# Patient Record
Sex: FEMALE | Race: WHITE | NOT HISPANIC OR LATINO | Employment: FULL TIME | URBAN - METROPOLITAN AREA
[De-identification: names, ages, dates, MRNs, and addresses within clinical notes are randomized per-mention and may not be internally consistent; named-entity substitution may affect disease eponyms.]

---

## 2017-05-08 RX ORDER — METHIMAZOLE 5 MG/1
5 TABLET ORAL DAILY
Status: ON HOLD | COMMUNITY
End: 2017-05-17

## 2017-05-08 RX ORDER — ACETAMINOPHEN 325 MG/1
650 TABLET ORAL EVERY 4 HOURS PRN
Status: ON HOLD | COMMUNITY
End: 2017-05-17

## 2017-05-08 RX ORDER — ESCITALOPRAM OXALATE 10 MG/1
10 TABLET ORAL DAILY
COMMUNITY
End: 2017-08-25

## 2017-05-17 ENCOUNTER — HOSPITAL ENCOUNTER (OUTPATIENT)
Facility: MEDICAL CENTER | Age: 32
End: 2017-05-18
Attending: SURGERY | Admitting: SURGERY
Payer: COMMERCIAL

## 2017-05-17 PROBLEM — E05.20 GOITER, TOXIC, MULTINODULAR: Status: ACTIVE | Noted: 2017-05-17

## 2017-05-17 LAB
ALBUMIN SERPL BCP-MCNC: 4.2 G/DL (ref 3.2–4.9)
B-HCG FREE SERPL-ACNC: 5.7 G/DL
CALCIUM SERPL-MCNC: 8.9 MG/DL (ref 8.5–10.5)
HCG SERPL QL: NEGATIVE
IHCGL IHCGL: NORMAL G/DL

## 2017-05-17 PROCEDURE — 502594 HCHG SCISSOR HANDLE, HARMONIC ACE: Performed by: SURGERY

## 2017-05-17 PROCEDURE — 502590 HCHG PROBE, PRASS STANDARD: Performed by: SURGERY

## 2017-05-17 PROCEDURE — 160042 HCHG SURGERY MINUTES - EA ADDL 1 MIN LEVEL 5: Performed by: SURGERY

## 2017-05-17 PROCEDURE — 700101 HCHG RX REV CODE 250

## 2017-05-17 PROCEDURE — 502240 HCHG MISC OR SUPPLY RC 0272: Performed by: SURGERY

## 2017-05-17 PROCEDURE — 36415 COLL VENOUS BLD VENIPUNCTURE: CPT

## 2017-05-17 PROCEDURE — 700101 HCHG RX REV CODE 250: Performed by: SURGERY

## 2017-05-17 PROCEDURE — A9270 NON-COVERED ITEM OR SERVICE: HCPCS

## 2017-05-17 PROCEDURE — 160036 HCHG PACU - EA ADDL 30 MINS PHASE I: Performed by: SURGERY

## 2017-05-17 PROCEDURE — 160035 HCHG PACU - 1ST 60 MINS PHASE I: Performed by: SURGERY

## 2017-05-17 PROCEDURE — 700111 HCHG RX REV CODE 636 W/ 250 OVERRIDE (IP)

## 2017-05-17 PROCEDURE — 96374 THER/PROPH/DIAG INJ IV PUSH: CPT

## 2017-05-17 PROCEDURE — 700102 HCHG RX REV CODE 250 W/ 637 OVERRIDE(OP)

## 2017-05-17 PROCEDURE — A9270 NON-COVERED ITEM OR SERVICE: HCPCS | Performed by: SURGERY

## 2017-05-17 PROCEDURE — 501838 HCHG SUTURE GENERAL: Performed by: SURGERY

## 2017-05-17 PROCEDURE — G0378 HOSPITAL OBSERVATION PER HR: HCPCS

## 2017-05-17 PROCEDURE — 500126 HCHG BOVIE, NEEDLE TIP: Performed by: SURGERY

## 2017-05-17 PROCEDURE — 160002 HCHG RECOVERY MINUTES (STAT): Performed by: SURGERY

## 2017-05-17 PROCEDURE — 96375 TX/PRO/DX INJ NEW DRUG ADDON: CPT

## 2017-05-17 PROCEDURE — A6402 STERILE GAUZE <= 16 SQ IN: HCPCS | Performed by: SURGERY

## 2017-05-17 PROCEDURE — 84702 CHORIONIC GONADOTROPIN TEST: CPT

## 2017-05-17 PROCEDURE — 700102 HCHG RX REV CODE 250 W/ 637 OVERRIDE(OP): Performed by: SURGERY

## 2017-05-17 PROCEDURE — 700111 HCHG RX REV CODE 636 W/ 250 OVERRIDE (IP): Performed by: SURGERY

## 2017-05-17 PROCEDURE — 502627 HCHG HEMOSTAT, SURGICEL 4X4: Performed by: SURGERY

## 2017-05-17 PROCEDURE — 160031 HCHG SURGERY MINUTES - 1ST 30 MINS LEVEL 5: Performed by: SURGERY

## 2017-05-17 PROCEDURE — 88307 TISSUE EXAM BY PATHOLOGIST: CPT | Mod: 59

## 2017-05-17 PROCEDURE — 84703 CHORIONIC GONADOTROPIN ASSAY: CPT

## 2017-05-17 PROCEDURE — 82040 ASSAY OF SERUM ALBUMIN: CPT

## 2017-05-17 PROCEDURE — 160048 HCHG OR STATISTICAL LEVEL 1-5: Performed by: SURGERY

## 2017-05-17 PROCEDURE — 160009 HCHG ANES TIME/MIN: Performed by: SURGERY

## 2017-05-17 PROCEDURE — 82310 ASSAY OF CALCIUM: CPT

## 2017-05-17 RX ORDER — GABAPENTIN 300 MG/1
CAPSULE ORAL
Status: COMPLETED
Start: 2017-05-17 | End: 2017-05-17

## 2017-05-17 RX ORDER — DIPHENHYDRAMINE HYDROCHLORIDE 50 MG/ML
25 INJECTION INTRAMUSCULAR; INTRAVENOUS EVERY 6 HOURS PRN
Status: DISCONTINUED | OUTPATIENT
Start: 2017-05-17 | End: 2017-05-18 | Stop reason: HOSPADM

## 2017-05-17 RX ORDER — HALOPERIDOL 5 MG/ML
1 INJECTION INTRAMUSCULAR EVERY 6 HOURS PRN
Status: DISCONTINUED | OUTPATIENT
Start: 2017-05-17 | End: 2017-05-18 | Stop reason: HOSPADM

## 2017-05-17 RX ORDER — DEXTROSE MONOHYDRATE, SODIUM CHLORIDE, AND POTASSIUM CHLORIDE 50; 1.49; 4.5 G/1000ML; G/1000ML; G/1000ML
INJECTION, SOLUTION INTRAVENOUS CONTINUOUS
Status: DISCONTINUED | OUTPATIENT
Start: 2017-05-17 | End: 2017-05-18 | Stop reason: HOSPADM

## 2017-05-17 RX ORDER — CELECOXIB 200 MG/1
200 CAPSULE ORAL 2 TIMES DAILY WITH MEALS
Status: DISCONTINUED | OUTPATIENT
Start: 2017-05-17 | End: 2017-05-18 | Stop reason: HOSPADM

## 2017-05-17 RX ORDER — DEXTROSE MONOHYDRATE, SODIUM CHLORIDE, AND POTASSIUM CHLORIDE 50; 1.49; 4.5 G/1000ML; G/1000ML; G/1000ML
INJECTION, SOLUTION INTRAVENOUS
Status: COMPLETED | OUTPATIENT
Start: 2017-05-17 | End: 2017-05-17

## 2017-05-17 RX ORDER — ONDANSETRON 2 MG/ML
4 INJECTION INTRAMUSCULAR; INTRAVENOUS EVERY 4 HOURS PRN
Status: DISCONTINUED | OUTPATIENT
Start: 2017-05-17 | End: 2017-05-18 | Stop reason: HOSPADM

## 2017-05-17 RX ORDER — DEXAMETHASONE SODIUM PHOSPHATE 4 MG/ML
4 INJECTION, SOLUTION INTRA-ARTICULAR; INTRALESIONAL; INTRAMUSCULAR; INTRAVENOUS; SOFT TISSUE
Status: DISCONTINUED | OUTPATIENT
Start: 2017-05-17 | End: 2017-05-18 | Stop reason: HOSPADM

## 2017-05-17 RX ORDER — OXYCODONE HCL 5 MG/5 ML
SOLUTION, ORAL ORAL
Status: COMPLETED
Start: 2017-05-17 | End: 2017-05-17

## 2017-05-17 RX ORDER — ACETAMINOPHEN 500 MG
1000 TABLET ORAL EVERY 6 HOURS
Status: DISCONTINUED | OUTPATIENT
Start: 2017-05-17 | End: 2017-05-18 | Stop reason: HOSPADM

## 2017-05-17 RX ORDER — OXYCODONE HYDROCHLORIDE 5 MG/1
5 TABLET ORAL
Status: DISCONTINUED | OUTPATIENT
Start: 2017-05-17 | End: 2017-05-18 | Stop reason: HOSPADM

## 2017-05-17 RX ORDER — ACETAMINOPHEN 500 MG
TABLET ORAL
Status: COMPLETED
Start: 2017-05-17 | End: 2017-05-17

## 2017-05-17 RX ORDER — LEVOTHYROXINE SODIUM 0.12 MG/1
125 TABLET ORAL
Status: DISCONTINUED | OUTPATIENT
Start: 2017-05-18 | End: 2017-05-18 | Stop reason: HOSPADM

## 2017-05-17 RX ORDER — OXYCODONE HYDROCHLORIDE 10 MG/1
10 TABLET ORAL
Status: DISCONTINUED | OUTPATIENT
Start: 2017-05-17 | End: 2017-05-18 | Stop reason: HOSPADM

## 2017-05-17 RX ORDER — CELECOXIB 200 MG/1
CAPSULE ORAL
Status: COMPLETED
Start: 2017-05-17 | End: 2017-05-17

## 2017-05-17 RX ORDER — OMEPRAZOLE 20 MG/1
20 CAPSULE, DELAYED RELEASE ORAL DAILY
Status: DISCONTINUED | OUTPATIENT
Start: 2017-05-18 | End: 2017-05-18 | Stop reason: HOSPADM

## 2017-05-17 RX ORDER — ESCITALOPRAM OXALATE 10 MG/1
10 TABLET ORAL DAILY
Status: DISCONTINUED | OUTPATIENT
Start: 2017-05-18 | End: 2017-05-18 | Stop reason: HOSPADM

## 2017-05-17 RX ADMIN — HYDROMORPHONE HYDROCHLORIDE 0.2 MG: 1 INJECTION, SOLUTION INTRAMUSCULAR; INTRAVENOUS; SUBCUTANEOUS at 12:24

## 2017-05-17 RX ADMIN — GABAPENTIN 300 MG: 300 CAPSULE ORAL at 06:40

## 2017-05-17 RX ADMIN — DEXTROSE MONOHYDRATE, SODIUM CHLORIDE, AND POTASSIUM CHLORIDE: 50; 1.49; 4.5 INJECTION, SOLUTION INTRAVENOUS at 14:00

## 2017-05-17 RX ADMIN — ACETAMINOPHEN 1000 MG: 500 TABLET, FILM COATED ORAL at 06:40

## 2017-05-17 RX ADMIN — POTASSIUM CHLORIDE, DEXTROSE MONOHYDRATE AND SODIUM CHLORIDE: 150; 5; 450 INJECTION, SOLUTION INTRAVENOUS at 20:11

## 2017-05-17 RX ADMIN — OXYCODONE HYDROCHLORIDE 5 MG: 5 TABLET ORAL at 20:02

## 2017-05-17 RX ADMIN — CELECOXIB 400 MG: 200 CAPSULE ORAL at 06:40

## 2017-05-17 RX ADMIN — HYDROMORPHONE HYDROCHLORIDE 0.2 MG: 1 INJECTION, SOLUTION INTRAMUSCULAR; INTRAVENOUS; SUBCUTANEOUS at 12:17

## 2017-05-17 RX ADMIN — HYDROMORPHONE HYDROCHLORIDE 0.2 MG: 1 INJECTION, SOLUTION INTRAMUSCULAR; INTRAVENOUS; SUBCUTANEOUS at 13:00

## 2017-05-17 RX ADMIN — OXYCODONE HYDROCHLORIDE 5 MG: 5 TABLET ORAL at 16:38

## 2017-05-17 RX ADMIN — OXYCODONE HYDROCHLORIDE 10 MG: 5 SOLUTION ORAL at 11:00

## 2017-05-17 RX ADMIN — POTASSIUM CHLORIDE, DEXTROSE MONOHYDRATE AND SODIUM CHLORIDE: 150; 5; 450 INJECTION, SOLUTION INTRAVENOUS at 18:38

## 2017-05-17 RX ADMIN — ACETAMINOPHEN 1000 MG: 500 TABLET ORAL at 20:02

## 2017-05-17 RX ADMIN — ONDANSETRON 4 MG: 2 INJECTION INTRAMUSCULAR; INTRAVENOUS at 14:17

## 2017-05-17 RX ADMIN — CELECOXIB 200 MG: 200 CAPSULE ORAL at 18:37

## 2017-05-17 RX ADMIN — DIPHENHYDRAMINE HYDROCHLORIDE 25 MG: 50 INJECTION, SOLUTION INTRAMUSCULAR; INTRAVENOUS at 16:37

## 2017-05-17 RX ADMIN — HYDROMORPHONE HYDROCHLORIDE 0.2 MG: 1 INJECTION, SOLUTION INTRAMUSCULAR; INTRAVENOUS; SUBCUTANEOUS at 11:02

## 2017-05-17 RX ADMIN — HYDROMORPHONE HYDROCHLORIDE 0.2 MG: 1 INJECTION, SOLUTION INTRAMUSCULAR; INTRAVENOUS; SUBCUTANEOUS at 10:55

## 2017-05-17 RX ADMIN — FENTANYL CITRATE 50 MCG: 50 INJECTION, SOLUTION INTRAMUSCULAR; INTRAVENOUS at 13:50

## 2017-05-17 ASSESSMENT — LIFESTYLE VARIABLES
EVER_SMOKED: YES
ALCOHOL_USE: NO

## 2017-05-17 ASSESSMENT — PAIN SCALES - GENERAL
PAINLEVEL_OUTOF10: 5
PAINLEVEL_OUTOF10: 2
PAINLEVEL_OUTOF10: 5
PAINLEVEL_OUTOF10: 6
PAINLEVEL_OUTOF10: 7
PAINLEVEL_OUTOF10: 0
PAINLEVEL_OUTOF10: 5
PAINLEVEL_OUTOF10: 6
PAINLEVEL_OUTOF10: 2
PAINLEVEL_OUTOF10: 5
PAINLEVEL_OUTOF10: 2
PAINLEVEL_OUTOF10: 0
PAINLEVEL_OUTOF10: 5
PAINLEVEL_OUTOF10: 0
PAINLEVEL_OUTOF10: 6
PAINLEVEL_OUTOF10: 6

## 2017-05-17 NOTE — PROGRESS NOTES
Received report from PACU.  Assumed care of patient at 1430.    AA&O x 4.  Medicated for nausea.  Neck dressing saturated with serosang drainage - new gauze placed.  VSS.  SpO2: 93% on room air.

## 2017-05-17 NOTE — IP AVS SNAPSHOT
" <p align=\"LEFT\"><IMG SRC=\"[email or web address]\" alt=\"Image\" WIDTH=\"50%\" HEIGHT=\"200\" BORDER=\"\"></p>                   Name:Cookie Gutierrez  Medical Record Number:[id number]  CSN: [id number]    YOB: 1985   Age: 32 y.o.  Sex: female  HT:1.6 m (5' 2.99\") WT: 113.41 kg (250 lb 0.4 oz)          Admit Date: 5/17/2017     Discharge Date:   Today's Date: 5/18/2017  Attending Doctor:  Derek Phan M.D.                  Allergies:  Tramadol and Vicodin          Follow-up Information     1. Follow up with Derek Phan M.D. In 1 week.    Specialties:  Surgery, Radiology    Contact information    [address] [facility] [address]  [address]  [facility] [address] [phone]           Medication List      Take these Medications        Instructions    escitalopram 10 MG Tabs   Commonly known as:  LEXAPRO    Take 10 mg by mouth every day.   Dose:  10 mg       ibuprofen 800 MG Tabs   Start taking on:  5/22/2017   Commonly known as:  MOTRIN    Take 1 Tab by mouth every 8 hours as needed (with food).   Dose:  800 mg       omeprazole 20 MG delayed-release capsule   Commonly known as:  PRILOSEC    Take 1 Cap by mouth every day.   Dose:  20 mg       oxycodone immediate-release 5 MG Tabs   Commonly known as:  ROXICODONE    Take 1-2 Tabs by mouth every four hours as needed (Moderate Pain (NRS Pain Scale 4-6; CPOT Pain Scale 3-5)).   Dose:  5-10 mg       SYNTHROID 137 MCG Tabs   Generic drug:  levothyroxine    Take 1 Tab by mouth Every morning on an empty stomach.   Dose:  137 mcg         "

## 2017-05-17 NOTE — OP REPORT
DATE OF SERVICE:  05/17/2017    DATE OF OPERATION:  05/17/2017    PREOPERATIVE DIAGNOSIS:  Mildly toxic multinodular goiter.    POSTOPERATIVE DIAGNOSIS:  Mildly toxic multinodular goiter.    OPERATION PERFORMED:  1.  Total thyroidectomy.  2.  Deep cervical lymph node biopsy.  3.  Bilateral NIMS, recurrent laryngeal nerve monitoring.    SURGEON:  Evens Phan MD    FIRST ASSISTANT:  SARTHAK Doe    ANESTHESIOLOGIST:  Mary Ann Welch MD    ANESTHETIC TECHNIQUE:  General endotracheal anesthesia.    OPERATIVE FINDINGS:  The thyroid was enlarged and very firm in texture.  A   mildly prominent left deep cervical lymph nodes were noted.  Multiple nodules   were present within the thyroid gland and none of those nodules appeared to   extend beyond the capsule of the thyroid.    DESCRIPTION OF THE PROCEDURE:  With the patient in the supine position, the   head of the bed slightly elevated, the neck slightly extended and the patient   intubated with a NIMS endotracheal tube, the precordial electrodes were placed   by the surgical assistant who then monitored the recurrent laryngeal nerves   bilaterally throughout the procedure.  The neck was prepared with Betadine and   draped in the usual fashion.  The neck was entered through a lower transverse   cervical incision, carried down through the platysma.  Subplatysmal flaps   were dissected superiorly to the level of the thyroid notch and inferiorly   down into the sternal notch.  The midline cervical fascia was incised down to   the capsule of the thyroid.  The strap muscles were mobilized off the left   thyroid lobe and the superior pole vessels progressively divided with the   harmonic focus.  The middle and inferior thyroid veins were divided with the   harmonic focus.  The recurrent laryngeal nerve and both parathyroids were   identified and protected.  Branches of the inferior thyroid artery were   divided on the capsule of the gland with the harmonic focus.   Smaller vessels   were either divided with the harmonic focus or, when too near the recurrent   nerve, divided between clamps and suture ligated with 3-0 coated Vicryl suture   as the gland was dissected free of the nerve and off the trachea.  Several   mildly prominent left deep cervical lymph nodes were noted near the recurrent   laryngeal nerve.  Approximately 3 of those nodes were dissected free and being   very careful to not injure the recurrent nerve.  The vascular pedicles were   divided with the harmonic focus.  The lymph nodes were sent for permanent   pathologic exam.    The strap muscles were mobilized off the right thyroid lobe.  The superior   pole vessels were progressively divided with the harmonic focus.  The middle   and inferior thyroid veins were divided with the harmonic focus.  The   recurrent laryngeal nerve and both parathyroids were again identified and   protected.  Branches of the inferior thyroid artery were divided on the   capsule of the gland with the harmonic focus.  Smaller vessels were either   divided with the harmonic focus or, when too near the recurrent nerve, divided   between clamps and suture ligated with 3-0 coated Vicryl suture as the gland   was dissected free of the nerve and off the trachea.  The entire thyroid gland   was examined on the operative field and sent for permanent pathologic exam.    Good hemostasis was assured.  The integrity of the recurrent laryngeal nerves   was documented bilaterally.  Small pieces of Surgicel fibrillar were placed in   both sides of the neck.  The midline cervical fascia was reapproximated with   running 3-0 coated Vicryl suture.  Platysma was reapproximated with   interrupted 3-0 coated Vicryl suture.  Skin was closed with interrupted 5-0   silk suture.  Sponge and needle counts were correct at the completion of the   procedure. The estimated blood loss was 25 mL.       ____________________________________     MARIELOS FIERRO  MD ALMEIDA / QUEENIE    DD:  05/17/2017 10:29:58  DT:  05/17/2017 12:26:17    D#:  [id number]  Job#:  [id number]    cc: SURY Del Valle

## 2017-05-17 NOTE — IP AVS SNAPSHOT
5/18/2017    Cookie Gutierrez  [address]  [facility] [address]    Dear Cookie:    [facility] wants to ensure your discharge home is safe and you or your loved ones have had all of your questions answered regarding your care after you leave the hospital.    Below is a list of resources and contact information should you have any questions regarding your hospital stay, follow-up instructions, or active medical symptoms.    Questions or Concerns Regarding… Contact   Medical Questions Related to Your Discharge  (7 days a week, 8am-5pm) Contact a Nurse Care Coordinator   [phone]   Medical Questions Not Related to Your Discharge  (24 hours a day / 7 days a week)  Contact the Nurse Health Line   [phone]    Medications or Discharge Instructions Refer to your discharge packet   or contact your [facility] Primary Care Provider   [phone]   Follow-up Appointment(s) Schedule your appointment via Klinq   or contact Scheduling [phone]   Billing Review your statement via Klinq  or contact Billing [phone]   Medical Records Review your records via Klinq   or contact Medical Records [phone]     You may receive a telephone call within two days of discharge. This call is to make certain you understand your discharge instructions and have the opportunity to have any questions answered. You can also easily access your medical information, test results and upcoming appointments via the Klinq free online health management tool. You can learn more and sign up at [email or web address]/Klinq. For assistance setting up your Klinq account, please call [phone].    Once again, we want to ensure your discharge home is safe and that you have a clear understanding of any next steps in your care. If you have any questions or concerns, please do not hesitate to contact us, we are here for you. Thank you for choosing [facility] for your healthcare needs.    Sincerely,    Your [facility]  Team

## 2017-05-17 NOTE — IP AVS SNAPSHOT
AllBusiness.com Access Code: Activation code not generated  Current AllBusiness.com Status: Active    Briggohart  A secure, online tool to manage your health information     [employer]’s AllBusiness.com® is a secure, online tool that connects you to your personalized health information from the privacy of your home -- day or night - making it very easy for you to manage your healthcare. Once the activation process is completed, you can even access your medical information using the AllBusiness.com lurdes, which is available for free in the Apple Lurdes store or Google Play store.     AllBusiness.com provides the following levels of access (as shown below):   My Chart Features   [facility] Primary Care Doctor [facility]  Specialists [facility]  Urgent  Care Non-[facility]  Primary Care  Doctor   Email your healthcare team securely and privately 24/7 X X X X   Manage appointments: schedule your next appointment; view details of past/upcoming appointments X      Request prescription refills. X      View recent personal medical records, including lab and immunizations X X X X   View health record, including health history, allergies, medications X X X X   Read reports about your outpatient visits, procedures, consult and ER notes X X X X   See your discharge summary, which is a recap of your hospital and/or ER visit that includes your diagnosis, lab results, and care plan. X X       How to register for AllBusiness.com:  1. Go to  [email or web address].  2. Click on the Sign Up Now box, which takes you to the New Member Sign Up page. You will need to provide the following information:  a. Enter your AllBusiness.com Access Code exactly as it appears at the top of this page. (You will not need to use this code after you’ve completed the sign-up process. If you do not sign up before the expiration date, you must request a new code.)   b. Enter your date of birth.   c. Enter your home email address.   d. Click Submit, and follow the next screen’s instructions.  3. Create a AllBusiness.com ID. This will  be your [employer]lik login ID and cannot be changed, so think of one that is secure and easy to remember.  4. Create a CrowdFlik password. You can change your password at any time.  5. Enter your Password Reset Question and Answer. This can be used at a later time if you forget your password.   6. Enter your e-mail address. This allows you to receive e-mail notifications when new information is available in CrowdFlik.  7. Click Sign Up. You can now view your health information.    For assistance activating your [employer]dF[employer] account, call [phone]

## 2017-05-17 NOTE — OR NURSING
1006 received from the OR, report from Dr Welch, s/p thyroidectomy, asleep, no OA, breathing unlabored & clear, throat dsg; gauze & tape d/i  1100 more awake, c/o pain, meds gtven  1130 oob/br & vd'd, c/o dizziness, pale & diaphoretic, returned to bed  1400 report called to Suzanne DARDEN, transported [id number]

## 2017-05-17 NOTE — OR SURGEON
Immediate Post-Operative Note      PreOp Diagnosis: Mildly toxic multinodular goiter    PostOp Diagnosis: Same    Procedure(s):1. Total thyroidectomy                         2. Deep left cervical lymph node biopsy                         3. Bilateral NIMS recurrent laryngeal nerve monitoring   Wound Class: Clean    Surgeon(s): Evens Phan M.D.    Assistant: TIFFANY Doe    Anesthesiologist/Type of Anesthesia:  Anesthesiologist: Mary Ann Welch M.D./General endotracheal anesthesia    Surgical Staff:  Circulator: Sharonda Huitron R.N.  Scrub Person: Medardo Andrews    Specimen: Thyroid gland    Estimated Blood Loss: 25 ml    Findings: Enlarged, firm, multinodular thyroid gland and mildly prominent left deep cervical lymph nodes.     Complications: None        5/17/2017 6:45 AM Evens Phan

## 2017-05-17 NOTE — IP AVS SNAPSHOT
" Home Care Instructions                                                                                                                  Name:Cookie Gutierrez  Medical Record Number:[id number]  CSN: [id number]    YOB: 1985   Age: 32 y.o.  Sex: female  HT:1.6 m (5' 2.99\") WT: 113.41 kg (250 lb 0.4 oz)          Admit Date: 5/17/2017     Discharge Date:   Today's Date: 5/18/2017  Attending Doctor:  Derek Phan M.D.                  Allergies:  Tramadol and Vicodin            Discharge Instructions       Discharge Instructions    Discharged to home by car with relative. Discharged via wheelchair, hospital escort: Yes.  Special equipment needed: Not Applicable    Be sure to schedule a follow-up appointment with your primary care doctor or any specialists as instructed.     Discharge Plan:     Regular diet   May bathe or shower in 1 day   Hep lock IV   Call the results of the 6 AM and noon calcium and albumin to me   Remove skin sutures and apply benzoin and steristrips on discharge   Send the prescription for the calcium blood test with the patient and have the patient get the blood test 2 days before the office appointment.   Take 2000 mg of Tums three times daily with meals.   Dispose of Iodine drops at home.     Diet Plan: Discussed  Activity Level: Discussed  Smoking Cessation Offered: Patient Refused  Confirmed Follow up Appointment: Appointment Scheduled  Confirmed Symptoms Management: Discussed  Medication Reconciliation Updated: Yes  Influenza Vaccine Indication: Patient Refuses (no longer available)    I understand that a diet low in cholesterol, fat, and sodium is recommended for good health. Unless I have been given specific instructions below for another diet, I accept this instruction as my diet prescription.   Other diet: Regular    Special Instructions: None    · Is patient discharged on Warfarin / Coumadin?   No     · Is patient Post Blood Transfusion?  No    Thyroidectomy, Care " After  Refer to this sheet in the next few weeks. These instructions provide you with information about caring for yourself after your procedure. Your health care provider may also give you more specific instructions. Your treatment has been planned according to current medical practices, but problems sometimes occur. Call your health care provider if you have any problems or questions after your procedure.  WHAT TO EXPECT AFTER THE PROCEDURE  After your procedure, it is typical to have:  · Mild pain in the neck or upper body, especially when swallowing.  · A sore throat.  · A weak voice.  HOME CARE INSTRUCTIONS   · Take medicines only as directed by your health care provider.  · If your entire thyroid gland was removed, you may need to take thyroid hormone medicine from now on.  · Do not take medicines that contain aspirin and ibuprofen until your health care provider says that you can. These medicines can increase your risk of bleeding.  · Some pain medicines cause constipation. Drink enough fluid to keep your urine clear or pale yellow. This can help to prevent constipation.  · Start slowly with eating. You may need to have only liquids and soft foods for a few days or as directed by your health care provider.  · Do not take baths, swim, or use a hot tub until your health care provider approves.  · There are many different ways to close and cover an incision, including stitches (sutures), skin glue, and adhesive strips. Follow your health care provider's instructions for:  ¨ Incision care.  ¨ Bandage (dressing) changes and removal.  ¨ Incision closure removal.  · Resume your usual activities as directed by your health care provider.  · For the first 10 days after the procedure or as instructed by your health care provider:  ¨ Do not lift anything heavier than 20 lb (9.1 kg).  ¨ Do not jog, swim, or do other strenuous exercises.  ¨ Do not play contact sports.  · Keep all follow-up visits as directed by your health  care provider. This is important.  SEEK MEDICAL CARE IF:  · The soreness in your throat gets worse.  · You have increased pain at your incision or incisions.  · You have increased bleeding from an incision.  · Your incision becomes infected. Watch for:  ¨ Swelling.  ¨ Redness.  ¨ Warmth.  ¨ Pus.  · You notice a bad smell coming from an incision or dressing.  · You have a fever.  · You feel lightheaded or faint.  · You have numbness, tingling, or muscle spasms in your:  ¨ Arms.  ¨ Hands.  ¨ Feet.  ¨ Face.  · You have trouble swallowing.  SEEK IMMEDIATE MEDICAL CARE IF:   · You develop a rash.  · You have difficulty breathing.  · You hear whistling noises coming from your chest.  · You develop a cough that gets worse.  · Your speech changes, or you have hoarseness that gets worse.     This information is not intended to replace advice given to you by your health care provider. Make sure you discuss any questions you have with your health care provider.     Document Released: 07/07/2006 Document Revised: 01/08/2016 Document Reviewed: 05/20/2015  [employer] Interactive Patient Education ©2016 [employer]        Depression / Suicide Risk    As you are discharged from this [facility] facility, it is important to learn how to keep safe from harming yourself.    Recognize the warning signs:  · Abrupt changes in personality, positive or negative- including increase in energy   · Giving away possessions  · Change in eating patterns- significant weight changes-  positive or negative  · Change in sleeping patterns- unable to sleep or sleeping all the time   · Unwillingness or inability to communicate  · Depression  · Unusual sadness, discouragement and loneliness  · Talk of wanting to die  · Neglect of personal appearance   · Rebelliousness- reckless behavior  · Withdrawal from people/activities they love  · Confusion- inability to concentrate     If you or a loved one observes any of these behaviors or has concerns about  self-harm, here's what you can do:  · Talk about it- your feelings and reasons for harming yourself  · Remove any means that you might use to hurt yourself (examples: pills, rope, extension cords, firearm)  · Get professional help from the community (Mental Health, Substance Abuse, psychological counseling)  · Do not be alone:Call your Safe Contact- someone whom you trust who will be there for you.  · Call your local CRISIS HOTLINE 675-3232 or 420-308-7973  · Call your local Children's Mobile Crisis Response Team [place] (213) 501-4197 or www.goBramble  · Call the toll free National Suicide Prevention Hotlines   · National Suicide Prevention Lifeline 516-763-VACG (7167)  · [employer].com Hope Line Network 800-SUICIDE (690-3719)        Follow-up Information     1. Follow up with Derek Phan M.D. In 1 week.    Specialties:  Surgery, Radiology    Contact information    [id number]  [place] [address] [phone]           Discharge Medication Instructions:    Below are the medications your physician expects you to take upon discharge:    Review all your home medications and newly ordered medications with your doctor and/or pharmacist. Follow medication instructions as directed by your doctor and/or pharmacist.    Please keep your medication list with you and share with your physician.               Medication List      START taking these medications        Instructions    Morning Afternoon Evening Bedtime    oxycodone immediate-release 5 MG Tabs   Last time this was given:  10 mg on 5/18/2017  6:02 AM   Commonly known as:  ROXICODONE        Take 1-2 Tabs by mouth every four hours as needed (Moderate Pain (NRS Pain Scale 4-6; CPOT Pain Scale 3-5)).   Dose:  5-10 mg                        SYNTHROID 137 MCG Tabs   Last time this was given:  125 mcg on 5/18/2017  6:03 AM   Generic drug:  levothyroxine        Take 1 Tab by mouth Every morning on an empty stomach.   Dose:  137 mcg                           CONTINUE taking these medications        Instructions    Morning Afternoon Evening Bedtime    escitalopram 10 MG Tabs   Last time this was given:  10 mg on 5/18/2017  9:00 AM   Commonly known as:  LEXAPRO        Take 10 mg by mouth every day.   Dose:  10 mg                        ibuprofen 800 MG Tabs   Start taking on:  5/22/2017   Commonly known as:  MOTRIN        Take 1 Tab by mouth every 8 hours as needed (with food).   Dose:  800 mg                        omeprazole 20 MG delayed-release capsule   Last time this was given:  20 mg on 5/18/2017  8:25 AM   Commonly known as:  PRILOSEC        Take 1 Cap by mouth every day.   Dose:  20 mg                             Where to Get Your Medications      Information about where to get these medications is not yet available     ! Ask your nurse or doctor about these medications    - oxycodone immediate-release 5 MG Tabs  - SYNTHROID 137 MCG Tabs            Instructions           Diet / Nutrition:    Follow any diet instructions given to you by your doctor or the dietician, including how much salt (sodium) you are allowed each day.    If you are overweight, talk to your doctor about a weight reduction plan.    Activity:    Remain physically active following your doctor's instructions about exercise and activity.    Rest often.     Any time you become even a little tired or short of breath, SIT DOWN and rest.    Worsening Symptoms:    Report any of the following signs and symptoms to the doctor's office immediately:    *Pain of jaw, arm, or neck  *Chest pain not relieved by medication                               *Dizziness or loss of consciousness  *Difficulty breathing even when at rest   *More tired than usual                                       *Bleeding drainage or swelling of surgical site  *Swelling of feet, ankles, legs or stomach                 *Fever (>100ºF)  *Pink or blood tinged sputum  *Weight gain (3lbs/day or 5lbs /week)           *Shock from  internal defibrillator (if applicable)  *Palpitations or irregular heartbeats                *Cool and/or numb extremities    Stroke Awareness    Common Risk Factors for Stroke include:    Age  Atrial Fibrillation  Carotid Artery Stenosis  Diabetes Mellitus  Excessive alcohol consumption  High blood pressure  Overweight   Physical inactivity  Smoking    Warning signs and symptoms of a stroke include:    *Sudden numbness or weakness of the face, arm or leg (especially on one side of the body).  *Sudden confusion, trouble speaking or understanding.  *Sudden trouble seeing in one or both eyes.  *Sudden trouble walking, dizziness, loss of balance or coordination.Sudden severe headache with no known cause.    It is very important to get treatment quickly when a stroke occurs. If you experience any of the above warning signs, call 911 immediately.                   Disclaimer         Quit Smoking / Tobacco Use:    I understand the use of any tobacco products increases my chance of suffering from future heart disease or stroke and could cause other illnesses which may shorten my life. Quitting the use of tobacco products is the single most important thing I can do to improve my health. For further information on smoking / tobacco cessation call a Toll Free Quit Line at 1-255.842.4251 (*National [employer] [place]) or 1-698.258.7535 (American Lung Association) or you can access the web based program at www.lungusa.org.    [place] Tobacco Users Help Line:  (703) 172-6834       Toll Free: 1-746.691.5364  Quit Tobacco Program [facility] (346)362-5930    Crisis Hotline:    Why Crisis Hotline:  2-898-LQXOLML or 1-356.743.2984    [place] Crisis Hotline:    1-653.643.1939 or 404-335-4260    Discharge Survey:   Thank you for choosing [facility]. We hope we did everything we could to make your hospital stay a pleasant one. You may be receiving a phone survey and we would appreciate your time and  participation in answering the questions. Your input is very valuable to us in our efforts to improve our service to our patients and their families.        My signature on this form indicates that:    1. I have reviewed and understand the above information.  2. My questions regarding this information have been answered to my satisfaction.  3. I have formulated a plan with my discharge nurse to obtain my prescribed medications for home.                  Disclaimer         __________________________________                     __________       ________                       Patient Signature                                                 Date                    Time

## 2017-05-18 VITALS
TEMPERATURE: 98 F | RESPIRATION RATE: 17 BRPM | SYSTOLIC BLOOD PRESSURE: 117 MMHG | DIASTOLIC BLOOD PRESSURE: 73 MMHG | OXYGEN SATURATION: 93 % | WEIGHT: 250.02 LBS | BODY MASS INDEX: 44.3 KG/M2 | HEIGHT: 63 IN | HEART RATE: 84 BPM

## 2017-05-18 PROBLEM — E05.20 GOITER, TOXIC, MULTINODULAR: Status: RESOLVED | Noted: 2017-05-17 | Resolved: 2017-05-18

## 2017-05-18 LAB
ALBUMIN SERPL BCP-MCNC: 3.8 G/DL (ref 3.2–4.9)
ALBUMIN SERPL BCP-MCNC: 3.8 G/DL (ref 3.2–4.9)
ALBUMIN SERPL BCP-MCNC: 3.9 G/DL (ref 3.2–4.9)
CALCIUM SERPL-MCNC: 7.9 MG/DL (ref 8.5–10.5)
CALCIUM SERPL-MCNC: 8 MG/DL (ref 8.5–10.5)
CALCIUM SERPL-MCNC: 8.2 MG/DL (ref 8.5–10.5)

## 2017-05-18 PROCEDURE — 700111 HCHG RX REV CODE 636 W/ 250 OVERRIDE (IP): Performed by: SURGERY

## 2017-05-18 PROCEDURE — 82310 ASSAY OF CALCIUM: CPT

## 2017-05-18 PROCEDURE — 700102 HCHG RX REV CODE 250 W/ 637 OVERRIDE(OP): Performed by: SURGERY

## 2017-05-18 PROCEDURE — A9270 NON-COVERED ITEM OR SERVICE: HCPCS | Performed by: SURGERY

## 2017-05-18 PROCEDURE — 96376 TX/PRO/DX INJ SAME DRUG ADON: CPT

## 2017-05-18 PROCEDURE — 700101 HCHG RX REV CODE 250: Performed by: SURGERY

## 2017-05-18 PROCEDURE — G0378 HOSPITAL OBSERVATION PER HR: HCPCS

## 2017-05-18 PROCEDURE — 36415 COLL VENOUS BLD VENIPUNCTURE: CPT

## 2017-05-18 PROCEDURE — 82040 ASSAY OF SERUM ALBUMIN: CPT

## 2017-05-18 RX ORDER — LEVOTHYROXINE SODIUM 137 MCG
137 TABLET ORAL
Qty: 30 TAB | Refills: 2 | Status: SHIPPED | OUTPATIENT
Start: 2017-05-18 | End: 2017-08-25

## 2017-05-18 RX ORDER — OXYCODONE HYDROCHLORIDE 5 MG/1
5-10 TABLET ORAL EVERY 4 HOURS PRN
Qty: 30 TAB | Refills: 0 | Status: SHIPPED | OUTPATIENT
Start: 2017-05-18 | End: 2017-08-25

## 2017-05-18 RX ORDER — IBUPROFEN 800 MG/1
800 TABLET ORAL EVERY 8 HOURS PRN
Qty: 30 TAB | Refills: 0 | COMMUNITY
Start: 2017-05-22 | End: 2017-08-25

## 2017-05-18 RX ADMIN — OXYCODONE HYDROCHLORIDE 10 MG: 10 TABLET ORAL at 06:02

## 2017-05-18 RX ADMIN — OXYCODONE HYDROCHLORIDE 10 MG: 10 TABLET ORAL at 00:27

## 2017-05-18 RX ADMIN — CELECOXIB 200 MG: 200 CAPSULE ORAL at 08:25

## 2017-05-18 RX ADMIN — ONDANSETRON 4 MG: 2 INJECTION INTRAMUSCULAR; INTRAVENOUS at 07:16

## 2017-05-18 RX ADMIN — OMEPRAZOLE 20 MG: 20 CAPSULE, DELAYED RELEASE ORAL at 08:25

## 2017-05-18 RX ADMIN — ESCITALOPRAM OXALATE 10 MG: 10 TABLET ORAL at 09:00

## 2017-05-18 RX ADMIN — ACETAMINOPHEN 1000 MG: 500 TABLET ORAL at 08:25

## 2017-05-18 RX ADMIN — LEVOTHYROXINE SODIUM 125 MCG: 125 TABLET ORAL at 06:03

## 2017-05-18 RX ADMIN — ACETAMINOPHEN 1000 MG: 500 TABLET ORAL at 01:55

## 2017-05-18 RX ADMIN — POTASSIUM CHLORIDE, DEXTROSE MONOHYDRATE AND SODIUM CHLORIDE: 150; 5; 450 INJECTION, SOLUTION INTRAVENOUS at 06:12

## 2017-05-18 ASSESSMENT — PAIN SCALES - GENERAL
PAINLEVEL_OUTOF10: 8
PAINLEVEL_OUTOF10: 8
PAINLEVEL_OUTOF10: 5

## 2017-05-18 NOTE — PROGRESS NOTES
"Report received from NOC shift RN. Patient is A/O X 4 and on room air. VSS. /69 mmHg  Pulse 82  Temp(Src) 36.4 °C (97.6 °F)  Resp 17  Ht 1.6 m (5' 2.99\")  Wt 113.41 kg (250 lb 0.4 oz)  BMI 44.30 kg/m2  SpO2 95%  LMP 05/01/2017  Breastfeeding? No Last Ca at 8.0; Albumin 3.9. Surgical incision on anterior neck LORENZA and approximated by sutures. No S&S of infection. BS normoactive X 4. Last BM reported was 5/16/2017. +void. Patient is able to ambulate independently. PIV present in left wrist running D% 1/2 NS w/20K @ 125mL/hr. Call light and family at bedside.     "

## 2017-05-18 NOTE — PROGRESS NOTES
The patient is afebrile with stable vital signs. The intake and output are good. The patient denies nausea, vomiting or paresthesias. The incision looks good. The IV site and extremities are normal. The total calcium was 8.2 mg/dl with an albumin of 3.8 grams at 0010 . I will heparin lock the IV, ambulate the patient and advance the diet as tolerated. I will continue to monitor the calcium levels today. If they stabilize, I anticipate discharge later today. I will see the patient in the office in 1 week. Activity limitations have been explained. The patient is to call or return sooner for any problems. The patient has been given discharge instructions and medications.

## 2017-05-18 NOTE — PROGRESS NOTES
Bedside report received.  Assessment complete.  A&O x 4. Patient calls appropriately.  Denies numbness and tingling. Moves extremities.   Patient up with standby assist.    Patient has 6/10 pain. MAR medications given  Denies N&V. Tolerating diet.  Surgical inc to neck. Dressing CDI  + void. Last BM PTA  Patient denies SOB. SpO2 96% on RA.  SCD's in place  Patient's family at bedside.  Review plan with of care with patient. Call light and personal belongings with in reach. Hourly rounding in place. All needs met at this time.

## 2017-05-18 NOTE — DISCHARGE INSTRUCTIONS
Discharge Instructions    Discharged to home by car with relative. Discharged via wheelchair, hospital escort: Yes.  Special equipment needed: Not Applicable    Be sure to schedule a follow-up appointment with your primary care doctor or any specialists as instructed.     Discharge Plan:     Regular diet   May bathe or shower in 1 day   Hep lock IV   Call the results of the 6 AM and noon calcium and albumin to me   Remove skin sutures and apply benzoin and steristrips on discharge   Send the prescription for the calcium blood test with the patient and have the patient get the blood test 2 days before the office appointment.   Take 2000 mg of Tums three times daily with meals.   Dispose of Iodine drops at home.     Diet Plan: Discussed  Activity Level: Discussed  Smoking Cessation Offered: Patient Refused  Confirmed Follow up Appointment: Appointment Scheduled  Confirmed Symptoms Management: Discussed  Medication Reconciliation Updated: Yes  Influenza Vaccine Indication: Patient Refuses (no longer available)    I understand that a diet low in cholesterol, fat, and sodium is recommended for good health. Unless I have been given specific instructions below for another diet, I accept this instruction as my diet prescription.   Other diet: Regular    Special Instructions: None    · Is patient discharged on Warfarin / Coumadin?   No     · Is patient Post Blood Transfusion?  No    Thyroidectomy, Care After  Refer to this sheet in the next few weeks. These instructions provide you with information about caring for yourself after your procedure. Your health care provider may also give you more specific instructions. Your treatment has been planned according to current medical practices, but problems sometimes occur. Call your health care provider if you have any problems or questions after your procedure.  WHAT TO EXPECT AFTER THE PROCEDURE  After your procedure, it is typical to have:  · Mild pain in the neck or upper body,  especially when swallowing.  · A sore throat.  · A weak voice.  HOME CARE INSTRUCTIONS   · Take medicines only as directed by your health care provider.  · If your entire thyroid gland was removed, you may need to take thyroid hormone medicine from now on.  · Do not take medicines that contain aspirin and ibuprofen until your health care provider says that you can. These medicines can increase your risk of bleeding.  · Some pain medicines cause constipation. Drink enough fluid to keep your urine clear or pale yellow. This can help to prevent constipation.  · Start slowly with eating. You may need to have only liquids and soft foods for a few days or as directed by your health care provider.  · Do not take baths, swim, or use a hot tub until your health care provider approves.  · There are many different ways to close and cover an incision, including stitches (sutures), skin glue, and adhesive strips. Follow your health care provider's instructions for:  ¨ Incision care.  ¨ Bandage (dressing) changes and removal.  ¨ Incision closure removal.  · Resume your usual activities as directed by your health care provider.  · For the first 10 days after the procedure or as instructed by your health care provider:  ¨ Do not lift anything heavier than 20 lb (9.1 kg).  ¨ Do not jog, swim, or do other strenuous exercises.  ¨ Do not play contact sports.  · Keep all follow-up visits as directed by your health care provider. This is important.  SEEK MEDICAL CARE IF:  · The soreness in your throat gets worse.  · You have increased pain at your incision or incisions.  · You have increased bleeding from an incision.  · Your incision becomes infected. Watch for:  ¨ Swelling.  ¨ Redness.  ¨ Warmth.  ¨ Pus.  · You notice a bad smell coming from an incision or dressing.  · You have a fever.  · You feel lightheaded or faint.  · You have numbness, tingling, or muscle spasms in your:  ¨ Arms.  ¨ Hands.  ¨ Feet.  ¨ Face.  · You have trouble  swallowing.  SEEK IMMEDIATE MEDICAL CARE IF:   · You develop a rash.  · You have difficulty breathing.  · You hear whistling noises coming from your chest.  · You develop a cough that gets worse.  · Your speech changes, or you have hoarseness that gets worse.     This information is not intended to replace advice given to you by your health care provider. Make sure you discuss any questions you have with your health care provider.     Document Released: 07/07/2006 Document Revised: 01/08/2016 Document Reviewed: 05/20/2015  [employer] Interactive Patient Education ©2016 [employer]        Depression / Suicide Risk    As you are discharged from this [facility] facility, it is important to learn how to keep safe from harming yourself.    Recognize the warning signs:  · Abrupt changes in personality, positive or negative- including increase in energy   · Giving away possessions  · Change in eating patterns- significant weight changes-  positive or negative  · Change in sleeping patterns- unable to sleep or sleeping all the time   · Unwillingness or inability to communicate  · Depression  · Unusual sadness, discouragement and loneliness  · Talk of wanting to die  · Neglect of personal appearance   · Rebelliousness- reckless behavior  · Withdrawal from people/activities they love  · Confusion- inability to concentrate     If you or a loved one observes any of these behaviors or has concerns about self-harm, here's what you can do:  · Talk about it- your feelings and reasons for harming yourself  · Remove any means that you might use to hurt yourself (examples: pills, rope, extension cords, firearm)  · Get professional help from the community (Mental Health, Substance Abuse, psychological counseling)  · Do not be alone:Call your Safe Contact- someone whom you trust who will be there for you.  · Call your local CRISIS HOTLINE 674-2077 or 509-420-8313  · Call your local Children's Mobile Crisis Response Team [place] (816) 660-6134 or www.[employer].NearbyNow  · Call the toll free National Suicide Prevention Hotlines   · National Suicide Prevention Lifeline 112-603-ZVCN (5028)  · National [employer] Line Network 800-SUICIDE (151-2424)

## 2017-05-18 NOTE — CARE PLAN
Problem: Communication  Goal: The ability to communicate needs accurately and effectively will improve  Outcome: PROGRESSING AS EXPECTED  Intervention: Educate patient and significant other/support system about the plan of care, procedures, treatments, medications and allow for questions  Educated patient on discharge plan and follow up per MD instructions.        Problem: Pain Management  Goal: Pain level will decrease to patient’s comfort goal  Outcome: PROGRESSING AS EXPECTED  Intervention: Follow pain managment plan developed in collaboration with patient and Interdisciplinary Team  Medicated patient for pain prior to discharge.

## 2017-05-18 NOTE — DIETARY
NUTRITION SERVICES: BMI - Pt with BMI >40 (=44.3). Weight loss counseling not appropriate in acute care setting.     RECOMMEND - Referral to outpatient nutrition services for weight management after D/C.

## 2017-05-18 NOTE — PROGRESS NOTES
Discharge and follow up plan discussed with patient. PIV and ID band removed. Wheelchair acquired for transportation to vehicle. Sutures removed from anterior neck, benzoin and steri strips applied.

## 2017-06-07 NOTE — ADDENDUM NOTE
Encounter addended by: Deepthi Bonilla R.N. on: 6/7/2017  1:10 PM<BR>     Documentation filed: Inpatient Document Flowsheet

## 2017-08-25 ENCOUNTER — OFFICE VISIT (OUTPATIENT)
Dept: ENDOCRINOLOGY | Facility: MEDICAL CENTER | Age: 32
End: 2017-08-25
Payer: COMMERCIAL

## 2017-08-25 ENCOUNTER — HOSPITAL ENCOUNTER (OUTPATIENT)
Dept: LAB | Facility: MEDICAL CENTER | Age: 32
End: 2017-08-25
Attending: INTERNAL MEDICINE
Payer: COMMERCIAL

## 2017-08-25 VITALS
OXYGEN SATURATION: 99 % | WEIGHT: 229 LBS | HEART RATE: 90 BPM | BODY MASS INDEX: 40.57 KG/M2 | HEIGHT: 63 IN | SYSTOLIC BLOOD PRESSURE: 120 MMHG | DIASTOLIC BLOOD PRESSURE: 80 MMHG

## 2017-08-25 DIAGNOSIS — E89.0 POSTOPERATIVE HYPOTHYROIDISM: ICD-10-CM

## 2017-08-25 DIAGNOSIS — E55.9 VITAMIN D DEFICIENCY: ICD-10-CM

## 2017-08-25 DIAGNOSIS — Z3A.18 18 WEEKS GESTATION OF PREGNANCY: ICD-10-CM

## 2017-08-25 DIAGNOSIS — E53.8 VITAMIN B12 DEFICIENCY: ICD-10-CM

## 2017-08-25 PROCEDURE — 99204 OFFICE O/P NEW MOD 45 MIN: CPT | Performed by: INTERNAL MEDICINE

## 2017-08-25 RX ORDER — LEVOTHYROXINE SODIUM 0.2 MG/1
400 TABLET ORAL
Qty: 60 TAB | Refills: 8 | Status: SHIPPED | OUTPATIENT
Start: 2017-08-25 | End: 2019-07-08

## 2017-08-25 RX ORDER — LEVOTHYROXINE SODIUM 0.15 MG/1
150 TABLET ORAL
COMMUNITY
End: 2017-08-25

## 2017-08-25 RX ORDER — ERGOCALCIFEROL 1.25 MG/1
50000 CAPSULE ORAL
Qty: 18 CAP | Refills: 0 | Status: SHIPPED | OUTPATIENT
Start: 2017-08-25 | End: 2017-10-20

## 2017-08-25 NOTE — MR AVS SNAPSHOT
"        Cookie Gutierrez   2017 8:10 AM   Office Visit   MRN: [id number]    Department:  [facility]   Dept Phone:  [phone]    Description:  Female : 1985   Provider:  Rogers Hinton M.D.           Reason for Visit     Hypothyroidism 18 weeks pregnant      Allergies as of 2017     Allergen Noted Reactions    Tramadol 2014   Itching    Vicodin [Hydrocodone-Acetaminophen] 2014       Gets super hyper      You were diagnosed with     Postoperative hypothyroidism   [876464]       Vitamin D deficiency   [9147137]       Vitamin B12 deficiency   [015900]       18 weeks gestation of pregnancy   [486112]         Vital Signs     Blood Pressure Pulse Height Weight Body Mass Index Oxygen Saturation    120/80 mmHg 90 1.6 m (5' 3\") 103.874 kg (229 lb) 40.58 kg/m2 99%    Smoking Status                   Former Smoker           Basic Information     Date Of Birth Sex Race Ethnicity Preferred Language    1985 Female White Non- English      Problem List              ICD-10-CM Priority Class Noted - Resolved    Depression F32.9   2015 - Present    Obesity, Class III, BMI 40-49.9 (morbid obesity) (CMS-HCC) E66.01   2015 - Present    HTN (hypertension) I10   2015 - Present    Tobacco dependence F17.200   2016 - Present      Health Maintenance        Date Due Completion Dates    IMM DTaP/Tdap/Td Vaccine (1 - Tdap) 2006 ---    IMM PNEUMOCOCCAL 19-64 (ADULT) MEDIUM RISK SERIES (1 of 1 - PPSV23) 2006 ---    PAP SMEAR 2008 ---    IMM INFLUENZA (1) 2017 ---            Current Immunizations     No immunizations on file.      Below and/or attached are the medications your provider expects you to take. Review all of your home medications and newly ordered medications with your provider and/or pharmacist. Follow medication instructions as directed by your provider and/or pharmacist. Please keep your medication list with you and share with " your provider. Update the information when medications are discontinued, doses are changed, or new medications (including over-the-counter products) are added; and carry medication information at all times in the event of emergency situations     Allergies:  TRAMADOL - Itching     VICODIN - (reactions not documented)               Medications  Valid as of: August 25, 2017 -  8:44 AM    Generic Name Brand Name Tablet Size Instructions for use    Ergocalciferol (Cap) DRISDOL 75811 units Take 1 Cap by mouth every 3 days for 56 days.        Levothyroxine Sodium (Tab) SYNTHROID 200 MCG Take 2 Tabs by mouth Every morning on an empty stomach.        Omeprazole (CAPSULE DELAYED RELEASE) PRILOSEC 20 MG Take 1 Cap by mouth every day.        .                 Medicines prescribed today were sent to:     [facility][id number] - [facility] [place] - [address]    [address] [address]    Phone: [phone] Fax: [phone]    Open 24 Hours?: No      Medication refill instructions:       If your prescription bottle indicates you have medication refills left, it is not necessary to call your provider’s office. Please contact your pharmacy and they will refill your medication.    If your prescription bottle indicates you do not have any refills left, you may request refills at any time through one of the following ways: The online [employer] system (except Urgent Care), by calling your provider’s office, or by asking your pharmacy to contact your provider’s office with a refill request. Medication refills are processed only during regular business hours and may not be available until the next business day. Your provider may request additional information or to have a follow-up visit with you prior to refilling your medication.   *Please Note: Medication refills are assigned a new Rx number when refilled electronically. Your pharmacy may indicate that no refills were authorized even though a new prescription for  the same medication is available at the pharmacy. Please request the medicine by name with the pharmacy before contacting your provider for a refill.        Your To Do List     Future Labs/Procedures Complete By Expires    FREE THYROXINE  9/8/2017 8/25/2018    TRIIDOTHYRONINE  9/8/2017 8/25/2018    Comments:    Total T3    TSH  9/8/2017 8/25/2018    FREE THYROXINE  10/6/2017 8/25/2018    Scheduling Instructions:    To be done after 6 weeks.    TRIIDOTHYRONINE  10/6/2017 8/25/2018    Scheduling Instructions:    To be done after 6 weeks.    Comments:    Total T3    TSH  10/6/2017 8/25/2018    Scheduling Instructions:    To be done after 6 weeks.    FREE THYROXINE  As directed 8/25/2018    TRIIDOTHYRONINE  As directed 8/25/2018    Comments:    Total T3    TRIIDOTHYRONINE  As directed 8/25/2018    Comments:    Total T3    TSH  As directed 8/25/2018    TSH  As directed 8/25/2018    VITAMIN B12  As directed 8/25/2018    VITAMIN D,25 HYDROXY  As directed 8/25/2018         [employer] Access Code: Activation code not generated  Current [employer] Status: Active

## 2017-08-25 NOTE — PROGRESS NOTES
New Patient Consult Note  Primary care physician: BILLIE Brasher    Reason for consult: Uncontrolled hypothyroidism and 18 weeks pregnant    HPI:  Cookie Gutierrez is a 32 y.o. old patient who comes in today for evaluation of uncontrolled hypothyroidism. She is currently eating the excessive pregnant. She is currently very anxious, nervous and worried about the baby's overall health and also is worried if she will be able to carry the pregnancy all the way to the term. She does feel tired and fatigued at all times.    ROS:  Constitutional: Anxiety, fatigue, No weight loss  Cardiac: No palpitations or racing heart  Resp: No shortness of breath  Neuro: No numbness or tinging in feet  Endo: No heat or cold intolerance, no polyuria or polydipsia  All other systems were reviewed and were negative.    Past Medical History:  Patient Active Problem List    Diagnosis Date Noted   • Tobacco dependence 01/07/2016   • Depression 02/12/2015   • Obesity, Class III, BMI 40-49.9 (morbid obesity) (CMS-HCC) 02/12/2015   • HTN (hypertension) 02/12/2015       Past Surgical History:  Past Surgical History   Procedure Laterality Date   • Other       growth on voicebox   • Other       tubes in ears   • Thyroidectomy total  5/17/2017     Procedure: TOTAL THYROIDECTOMY WITH BILATERAL NIMS RECURRENT LARYNGEAL NERVE MONITORING, DEEP CERVICAL LYMPH NODE BIOPSY ;  Surgeon: Derek Phan M.D.;  Location: SURGERY SAME DAY [facility];  Service:        Allergies:  Tramadol and Vicodin    Social History:  Social History     Social History   • Marital Status: Single     Spouse Name: N/A   • Number of Children: N/A   • Years of Education: N/A     Occupational History   • Not on file.     Social History Main Topics   • Smoking status: Former Smoker -- 12 years     Quit date: 03/01/2017   • Smokeless tobacco: Not on file      Comment: 3-4 daily   • Alcohol Use: No      Comment: currently uses vapor   • Drug Use: No   • Sexual  "Activity: Not on file     Other Topics Concern   • Not on file     Social History Narrative       Family History:  Family History   Problem Relation Age of Onset   • Hypertension Mother    • Hypertension Father    • Other Father      gout       Medications:    Current outpatient prescriptions:   •  levothyroxine (SYNTHROID) 200 MCG Tab, Take 2 Tabs by mouth Every morning on an empty stomach., Disp: 60 Tab, Rfl: 8  •  vitamin D, Ergocalciferol, (DRISDOL) 76801 units Cap capsule, Take 1 Cap by mouth every 3 days for 56 days., Disp: 18 Cap, Rfl: 0  •  omeprazole (PRILOSEC) 20 MG delayed-release capsule, Take 1 Cap by mouth every day., Disp: 30 Cap, Rfl: 0    Labs: Reviewed    Physical Examination:  Vital signs: /80 mmHg  Pulse 90  Ht 1.6 m (5' 3\")  Wt 103.874 kg (229 lb)  BMI 40.58 kg/m2  SpO2 99% Body mass index is 40.58 kg/(m^2).  General: in  apparent distress, cooperative, morbidly obese  Eyes: No scleral icterus or discharge  ENMT: Normal on external inspection of nose, lips, normal thyroid exam  Neck: No abnormal masses on inspection  Resp: Normal effort, clear to auscultation bilaterally   CVS: Regular rate and rhythm, S1 S2 normal, no murmur   Extremities: No edema  Abdomen: abdominal obesity present  Neuro: Alert and oriented  Skin: No rash  Psych: Normal mood and affect, intact memory and able to make informed decisions    Assessment and Plan:    1. Postoperative hypothyroidism & Pregnancy:   Increase levothyroxine (SYNTHROID) to 400 mcg daily. Will repeat TSH, Free T4 and Total T3 in 2 weeks to make sure we are heading in the right direction.     2. Vitamin D deficiency  She has a history of vitamin D deficiency and clearly states that her vitamin D levels have always been low. She would benefit from loading dose as per below.    - vitamin D, Ergocalciferol, (DRISDOL) 35063 units Cap capsule; Take 1 Cap by mouth every 3 days for 56 days.      3. Vitamin B12 deficiency  Rule out vitamin B12 " deficiency.    4. 18 weeks gestation of pregnancy  Being monitored closely by OB/GYN. There is also be closely monitoring the overall growth and development of the baby.      Return in about 2 months (around 10/25/2017).     Thank you for allowing me to participate in the care of this patient.    Rogers Hinton M.D.  08/25/2017    CC:   BILLEI Brasher    This note was created using voice recognition software (Dragon). The accuracy of the dictation is limited by the abilities of the software. I have reviewed the note prior to signing, however some errors in grammar and context are still possible. If you have any questions related to this note please do not hesitate to contact our office.

## 2017-08-27 LAB
25(OH)D3+25(OH)D2 SERPL-MCNC: 18.8 NG/ML (ref 30–100)
T3 SERPL-MCNC: 140 NG/DL (ref 71–180)
T4 FREE SERPL-MCNC: 1.23 NG/DL (ref 0.82–1.77)
TSH SERPL DL<=0.005 MIU/L-ACNC: 23.39 UIU/ML (ref 0.45–4.5)
VIT B12 SERPL-MCNC: 332 PG/ML (ref 211–946)

## 2017-09-13 ENCOUNTER — TELEPHONE (OUTPATIENT)
Dept: ENDOCRINOLOGY | Facility: MEDICAL CENTER | Age: 32
End: 2017-09-13

## 2017-09-13 LAB — TSH SERPL DL<=0.005 MIU/L-ACNC: 0.05 UIU/ML (ref 0.45–4.5)

## 2018-12-07 ENCOUNTER — OFFICE VISIT (OUTPATIENT)
Dept: URGENT CARE | Facility: MEDICAL CENTER | Age: 33
End: 2018-12-07
Payer: COMMERCIAL

## 2018-12-07 VITALS
TEMPERATURE: 97.2 F | OXYGEN SATURATION: 98 % | WEIGHT: 230.4 LBS | SYSTOLIC BLOOD PRESSURE: 120 MMHG | HEIGHT: 63 IN | DIASTOLIC BLOOD PRESSURE: 80 MMHG | BODY MASS INDEX: 40.82 KG/M2 | HEART RATE: 79 BPM

## 2018-12-07 DIAGNOSIS — J02.9 PHARYNGITIS, UNSPECIFIED ETIOLOGY: ICD-10-CM

## 2018-12-07 DIAGNOSIS — J02.9 SORE THROAT: ICD-10-CM

## 2018-12-07 LAB
INT CON NEG: NORMAL
INT CON POS: NORMAL
S PYO AG THROAT QL: NEGATIVE

## 2018-12-07 PROCEDURE — 99214 OFFICE O/P EST MOD 30 MIN: CPT | Performed by: PHYSICIAN ASSISTANT

## 2018-12-07 PROCEDURE — 87880 STREP A ASSAY W/OPTIC: CPT | Performed by: PHYSICIAN ASSISTANT

## 2018-12-07 RX ORDER — AMOXICILLIN 500 MG/1
500 CAPSULE ORAL 2 TIMES DAILY
Qty: 20 CAP | Refills: 0 | Status: SHIPPED | OUTPATIENT
Start: 2018-12-07 | End: 2018-12-17

## 2018-12-07 RX ORDER — FLUOXETINE HYDROCHLORIDE 20 MG/1
20 CAPSULE ORAL DAILY
Refills: 3 | COMMUNITY
Start: 2018-11-18 | End: 2021-12-04

## 2018-12-07 ASSESSMENT — ENCOUNTER SYMPTOMS
DIARRHEA: 0
VOMITING: 0
TROUBLE SWALLOWING: 0
ABDOMINAL PAIN: 0
HEADACHES: 0
SHORTNESS OF BREATH: 0
SWOLLEN GLANDS: 1
STRIDOR: 0
COUGH: 0
NECK PAIN: 0
HOARSE VOICE: 0

## 2018-12-07 NOTE — PROGRESS NOTES
"Subjective:      Cookie Wolfe is a 33 y.o. female who presents with Pharyngitis (fever/ nose and sinus pressure/ body aches X5 days)            Pharyngitis    This is a new problem. The current episode started in the past 7 days. The problem has been gradually worsening. Neither side of throat is experiencing more pain than the other. There has been no fever. The fever has been present for less than 1 day. The pain is at a severity of 4/10. The pain is mild. Associated symptoms include congestion and swollen glands. Pertinent negatives include no abdominal pain, coughing, diarrhea, drooling, ear discharge, ear pain, headaches, hoarse voice, plugged ear sensation, neck pain, shortness of breath, stridor, trouble swallowing or vomiting. She has had no exposure to strep. She has tried nothing for the symptoms. The treatment provided no relief.     Past medical history: Is not pertinent to this examination  Past surgical history: Not pertinent to this examination  Family history: Is not pertinent to this examination  Allergies: No known drug allergies  Social history: Is reviewed in Epic      Review of Systems   HENT: Positive for congestion. Negative for drooling, ear discharge, ear pain, hoarse voice and trouble swallowing.    Respiratory: Negative for cough, shortness of breath and stridor.    Gastrointestinal: Negative for abdominal pain, diarrhea and vomiting.   Musculoskeletal: Negative for neck pain.   Neurological: Negative for headaches.          Objective:     /80   Pulse 79   Temp 36.2 °C (97.2 °F) (Temporal)   Ht 1.6 m (5' 3\")   Wt 104.5 kg (230 lb 6.4 oz)   SpO2 98%   BMI 40.81 kg/m²      Physical Exam       Gen.: Patient is A and O ×3, no acute distress, well-nourished well-hydrated  Vitals: Are listed and unremarkable  HEENT: Heads normocephalic, atraumatic, PERRLA, extraocular movements intact, TMs and oropharynx clear  Neck: Soft supple without cervical " lymphadenopathy  Cardiovascular: Regular rate and rhythm normal S1 and S2. No murmurs, rubs or gallops  Lungs are clear to auscultation bilaterally. no wheezes rales or rhonchi  Abdomen is soft, nontender, nondistended with good bowel sounds, no hepatosplenomegaly  Skin: Is well perfused without evidence of rash or lesions  Neurological:  cranial nerves II through XII were assessed and intact.  Musculoskeletal: Full range of motion, 5 out of 5 strength against resistance  Neurovascularly: Intact with a 2 second cap refill, good distal pulses    Urgent CARE course: Rapid strep was negative     Assessment/Plan:     1. Sore throat    - POCT Rapid Strep A    2. Pharyngitis, unspecified etiology  Discussed viral etiology at length.  Patient requesting antibiotic as she states she cannot afford to miss work.  Discussed supportive care measures at length and to only fill antibiotic prescription if symptoms persist or worsen despite.  No, I was mine.  Usually move the  - amoxicillin (AMOXIL) 500 MG Cap; Take 1 Cap by mouth 2 times a day for 10 days.  Dispense: 20 Cap; Refill: 0

## 2018-12-07 NOTE — LETTER
December 7, 2018         Patient: Cookie Wolfe   YOB: 1985   Date of Visit: 12/7/2018           To Whom it May Concern:    Cookie Wolfe was seen in my clinic on 12/7/2018. Please excuse from work on 12/7/18.      If you have any questions or concerns, please don't hesitate to call.        Sincerely,           Aaliyah Christensen P.A.-C.  Electronically Signed

## 2019-02-04 ENCOUNTER — HOSPITAL ENCOUNTER (EMERGENCY)
Facility: MEDICAL CENTER | Age: 34
End: 2019-02-04
Attending: EMERGENCY MEDICINE
Payer: COMMERCIAL

## 2019-02-04 VITALS
BODY MASS INDEX: 41.26 KG/M2 | TEMPERATURE: 98.1 F | DIASTOLIC BLOOD PRESSURE: 79 MMHG | SYSTOLIC BLOOD PRESSURE: 133 MMHG | OXYGEN SATURATION: 98 % | RESPIRATION RATE: 16 BRPM | HEART RATE: 100 BPM | WEIGHT: 224.21 LBS | HEIGHT: 62 IN

## 2019-02-04 DIAGNOSIS — R11.10 VOMITING AND DIARRHEA: ICD-10-CM

## 2019-02-04 DIAGNOSIS — R19.7 VOMITING AND DIARRHEA: ICD-10-CM

## 2019-02-04 DIAGNOSIS — E86.0 DEHYDRATION: ICD-10-CM

## 2019-02-04 LAB
ALBUMIN SERPL BCP-MCNC: 5.1 G/DL (ref 3.2–4.9)
ALBUMIN/GLOB SERPL: 1.8 G/DL
ALP SERPL-CCNC: 106 U/L (ref 30–99)
ALT SERPL-CCNC: 18 U/L (ref 2–50)
ANION GAP SERPL CALC-SCNC: 13 MMOL/L (ref 0–11.9)
ANISOCYTOSIS BLD QL SMEAR: ABNORMAL
APPEARANCE UR: CLEAR
AST SERPL-CCNC: 17 U/L (ref 12–45)
BACTERIA #/AREA URNS HPF: ABNORMAL /HPF
BASOPHILS # BLD AUTO: 1.8 % (ref 0–1.8)
BASOPHILS # BLD: 0.27 K/UL (ref 0–0.12)
BILIRUB SERPL-MCNC: 1.2 MG/DL (ref 0.1–1.5)
BILIRUB UR QL STRIP.AUTO: NEGATIVE
BUN SERPL-MCNC: 24 MG/DL (ref 8–22)
CALCIUM SERPL-MCNC: 9.3 MG/DL (ref 8.5–10.5)
CHLORIDE SERPL-SCNC: 100 MMOL/L (ref 96–112)
CO2 SERPL-SCNC: 19 MMOL/L (ref 20–33)
COLOR UR: YELLOW
CREAT SERPL-MCNC: 1.03 MG/DL (ref 0.5–1.4)
EOSINOPHIL # BLD AUTO: 0 K/UL (ref 0–0.51)
EOSINOPHIL NFR BLD: 0 % (ref 0–6.9)
EPI CELLS #/AREA URNS HPF: ABNORMAL /HPF
ERYTHROCYTE [DISTWIDTH] IN BLOOD BY AUTOMATED COUNT: 37.8 FL (ref 35.9–50)
GLOBULIN SER CALC-MCNC: 2.9 G/DL (ref 1.9–3.5)
GLUCOSE SERPL-MCNC: 121 MG/DL (ref 65–99)
GLUCOSE UR STRIP.AUTO-MCNC: NEGATIVE MG/DL
HCG UR QL: NEGATIVE
HCT VFR BLD AUTO: 41.7 % (ref 37–47)
HGB BLD-MCNC: 12.4 G/DL (ref 12–16)
HYALINE CASTS #/AREA URNS LPF: ABNORMAL /LPF
KETONES UR STRIP.AUTO-MCNC: 80 MG/DL
LEUKOCYTE ESTERASE UR QL STRIP.AUTO: NEGATIVE
LIPASE SERPL-CCNC: <3 U/L (ref 11–82)
LYMPHOCYTES # BLD AUTO: 0.77 K/UL (ref 1–4.8)
LYMPHOCYTES NFR BLD: 5.2 % (ref 22–41)
MANUAL DIFF BLD: NORMAL
MCH RBC QN AUTO: 20.5 PG (ref 27–33)
MCHC RBC AUTO-ENTMCNC: 29.7 G/DL (ref 33.6–35)
MCV RBC AUTO: 68.9 FL (ref 81.4–97.8)
MICRO URNS: ABNORMAL
MICROCYTES BLD QL SMEAR: ABNORMAL
MONOCYTES # BLD AUTO: 0.25 K/UL (ref 0–0.85)
MONOCYTES NFR BLD AUTO: 1.7 % (ref 0–13.4)
MORPHOLOGY BLD-IMP: NORMAL
NEUTROPHILS # BLD AUTO: 13.6 K/UL (ref 2–7.15)
NEUTROPHILS NFR BLD: 90.4 % (ref 44–72)
NEUTS BAND NFR BLD MANUAL: 0.9 % (ref 0–10)
NITRITE UR QL STRIP.AUTO: NEGATIVE
NRBC # BLD AUTO: 0 K/UL
NRBC BLD-RTO: 0 /100 WBC
PH UR STRIP.AUTO: 5.5 [PH]
PLATELET # BLD AUTO: 440 K/UL (ref 164–446)
PLATELET BLD QL SMEAR: NORMAL
PMV BLD AUTO: 9.8 FL (ref 9–12.9)
POTASSIUM SERPL-SCNC: 4 MMOL/L (ref 3.6–5.5)
PROT SERPL-MCNC: 8 G/DL (ref 6–8.2)
PROT UR QL STRIP: 100 MG/DL
RBC # BLD AUTO: 6.05 M/UL (ref 4.2–5.4)
RBC # URNS HPF: ABNORMAL /HPF
RBC BLD AUTO: PRESENT
RBC UR QL AUTO: NEGATIVE
SODIUM SERPL-SCNC: 132 MMOL/L (ref 135–145)
SP GR UR STRIP.AUTO: 1.03
UROBILINOGEN UR STRIP.AUTO-MCNC: 0.2 MG/DL
WBC # BLD AUTO: 14.9 K/UL (ref 4.8–10.8)
WBC #/AREA URNS HPF: ABNORMAL /HPF

## 2019-02-04 PROCEDURE — 83690 ASSAY OF LIPASE: CPT

## 2019-02-04 PROCEDURE — 99285 EMERGENCY DEPT VISIT HI MDM: CPT

## 2019-02-04 PROCEDURE — 81025 URINE PREGNANCY TEST: CPT

## 2019-02-04 PROCEDURE — 80053 COMPREHEN METABOLIC PANEL: CPT

## 2019-02-04 PROCEDURE — 36415 COLL VENOUS BLD VENIPUNCTURE: CPT

## 2019-02-04 PROCEDURE — 700105 HCHG RX REV CODE 258: Performed by: EMERGENCY MEDICINE

## 2019-02-04 PROCEDURE — 81001 URINALYSIS AUTO W/SCOPE: CPT

## 2019-02-04 PROCEDURE — 85027 COMPLETE CBC AUTOMATED: CPT

## 2019-02-04 PROCEDURE — 700102 HCHG RX REV CODE 250 W/ 637 OVERRIDE(OP): Performed by: EMERGENCY MEDICINE

## 2019-02-04 PROCEDURE — 85007 BL SMEAR W/DIFF WBC COUNT: CPT

## 2019-02-04 PROCEDURE — 700111 HCHG RX REV CODE 636 W/ 250 OVERRIDE (IP): Performed by: EMERGENCY MEDICINE

## 2019-02-04 PROCEDURE — 96374 THER/PROPH/DIAG INJ IV PUSH: CPT

## 2019-02-04 PROCEDURE — A9270 NON-COVERED ITEM OR SERVICE: HCPCS | Performed by: EMERGENCY MEDICINE

## 2019-02-04 RX ORDER — FAMOTIDINE 20 MG/1
20 TABLET, FILM COATED ORAL 2 TIMES DAILY
Qty: 60 TAB | Refills: 0 | Status: SHIPPED | OUTPATIENT
Start: 2019-02-04 | End: 2019-07-08

## 2019-02-04 RX ORDER — FAMOTIDINE 20 MG/1
40 TABLET, FILM COATED ORAL ONCE
Status: COMPLETED | OUTPATIENT
Start: 2019-02-04 | End: 2019-02-04

## 2019-02-04 RX ORDER — ONDANSETRON 2 MG/ML
4 INJECTION INTRAMUSCULAR; INTRAVENOUS ONCE
Status: COMPLETED | OUTPATIENT
Start: 2019-02-04 | End: 2019-02-04

## 2019-02-04 RX ORDER — SODIUM CHLORIDE 9 MG/ML
2000 INJECTION, SOLUTION INTRAVENOUS ONCE
Status: COMPLETED | OUTPATIENT
Start: 2019-02-04 | End: 2019-02-04

## 2019-02-04 RX ORDER — ONDANSETRON 4 MG/1
4 TABLET, ORALLY DISINTEGRATING ORAL EVERY 6 HOURS PRN
COMMUNITY
End: 2019-02-04

## 2019-02-04 RX ORDER — LIOTHYRONINE SODIUM 5 UG/1
5 TABLET ORAL DAILY
COMMUNITY
End: 2019-07-08

## 2019-02-04 RX ORDER — ONDANSETRON 4 MG/1
4 TABLET, ORALLY DISINTEGRATING ORAL EVERY 8 HOURS PRN
Qty: 10 TAB | Refills: 0 | Status: SHIPPED | OUTPATIENT
Start: 2019-02-04 | End: 2019-07-08

## 2019-02-04 RX ADMIN — LIDOCAINE HYDROCHLORIDE 30 ML: 20 SOLUTION OROPHARYNGEAL at 03:32

## 2019-02-04 RX ADMIN — ONDANSETRON 4 MG: 2 INJECTION INTRAMUSCULAR; INTRAVENOUS at 02:28

## 2019-02-04 RX ADMIN — FAMOTIDINE 40 MG: 20 TABLET ORAL at 02:29

## 2019-02-04 RX ADMIN — SODIUM CHLORIDE 2000 ML: 9 INJECTION, SOLUTION INTRAVENOUS at 02:29

## 2019-02-04 NOTE — ED NOTES
Pt Given discharge instructions/ prescriptions/ home care instructions, Pt verbalized understanding of instructions given, pt ambulatory to KYLE kim.

## 2019-02-04 NOTE — ED PROVIDER NOTES
"ED Provider Note    Scribed for Blake Bal M.D. by Gopi Gudino. 2/4/2019  1:42 AM    Means of arrival: Walk in   History obtained from: Patient  History limited by: None    CHIEF COMPLAINT  Chief Complaint   Patient presents with   • Abdominal Cramping     since yesterday   • Nausea/Vomiting/Diarrhea     since yesterday       HPI    Cookie Wolfe is a 33 y.o. female presenting with nausea, vomiting, and diarrhea onset yesterday morning. She reports the diarrhea began at 9 AM, followed by vomiting at 10:30 AM. The patient states she has had multiple episodes of vomiting and diarrhea since. She endorses associated inability to tolerate solids or liquids. The patient also reports to have constant epigastric pain described as \"sharp\" in quality, and 8/10 severity. She additionally states she has had intermittent chills. The patient denies hematochezia, hematemesis, chest pain, headache, dysuria, vaginal bleeding, cough, or fever. She states that she does not have any other medical problems, and denies drug or alcohol use.     REVIEW OF SYSTEMS  See HPI for further details.   Pertinent positives include: nausea, vomiting, diarrhea, epigastric pain, chills  Pertinent negative include: hematochezia, hematemesis, chest pain, headache, dysuria, vaginal bleeding, cough, or fever.  10 + review of systems otherwise negative     PAST MEDICAL HISTORY   has a past medical history of Disorder of thyroid; Heart burn; Psychiatric problem; and Snoring.    SOCIAL HISTORY  Social History     Social History Main Topics   • Smoking status: Former Smoker     Years: 12.00     Quit date: 3/1/2017   • Smokeless tobacco: Never Used      Comment: 3-4 daily   • Alcohol use No   • Drug use: No       SURGICAL HISTORY   has a past surgical history that includes other; other; and thyroidectomy total (5/17/2017).    CURRENT MEDICATIONS  Home Medications     Reviewed by Sharifa Barros R.N. (Registered Nurse) on 02/04/19 at 0049  " "Med List Status: Partial   Medication Last Dose Status   FLUoxetine (PROZAC) 20 MG Cap  Active   levothyroxine (SYNTHROID) 200 MCG Tab  Active   liothyronine (CYTOMEL) 5 MCG Tab  Active   omeprazole (PRILOSEC) 20 MG delayed-release capsule  Active   ondansetron (ZOFRAN ODT) 4 MG TABLET DISPERSIBLE  Active                ALLERGIES  Allergies   Allergen Reactions   • Tramadol Itching   • Vicodin [Hydrocodone-Acetaminophen]      Gets super hyper       PHYSICAL EXAM  VITAL SIGNS: /79   Pulse (!) 117   Temp 36.7 °C (98.1 °F) (Temporal)   Resp 16   Ht 1.575 m (5' 2\")   Wt 101.7 kg (224 lb 3.3 oz)   SpO2 98%   BMI 41.01 kg/m²    SpO2: I interpret this pulse oximetry as normal  Constitutional: Well developed, Well nourished, no distress, Non-toxic appearance.   HENT: Normocephalic, Atraumatic, Bilateral external ears normal, Oropharynx moist, No oral exudates.   Eyes: PERRLA, EOMI, Conjunctiva normal, No discharge.   Neck: No tenderness, Supple, No stridor.   Lymphatic: No lymphadenopathy noted.   Cardiovascular: Normal heart rate, Normal rhythm.   Thorax & Lungs: Clear to auscultation bilaterally, No respiratory distress, No wheezing, No crackles.   Abdomen: Soft, No tenderness, No masses, No pulsatile masses, Negative whiteside's sign.  Skin: Warm, Dry, No erythema, No rash.   Extremities:, No edema No cyanosis.   Musculoskeletal: No tenderness to palpation or major deformities noted.  Intact distal pulses  Neurologic: Awake, alert. Moves all extremities spontaneously.  Psychiatric: Affect normal, Judgment normal, Mood normal.     DIAGNOSTIC STUDIES / PROCEDURES    LABORATORY  Results for orders placed or performed during the hospital encounter of 02/04/19   CBC WITH DIFFERENTIAL   Result Value Ref Range    WBC 14.9 (H) 4.8 - 10.8 K/uL    RBC 6.05 (H) 4.20 - 5.40 M/uL    Hemoglobin 12.4 12.0 - 16.0 g/dL    Hematocrit 41.7 37.0 - 47.0 %    MCV 68.9 (L) 81.4 - 97.8 fL    MCH 20.5 (L) 27.0 - 33.0 pg    MCHC 29.7 " (L) 33.6 - 35.0 g/dL    RDW 37.8 35.9 - 50.0 fL    Platelet Count 440 164 - 446 K/uL    MPV 9.8 9.0 - 12.9 fL    Neutrophils-Polys 90.40 (H) 44.00 - 72.00 %    Lymphocytes 5.20 (L) 22.00 - 41.00 %    Monocytes 1.70 0.00 - 13.40 %    Eosinophils 0.00 0.00 - 6.90 %    Basophils 1.80 0.00 - 1.80 %    Nucleated RBC 0.00 /100 WBC    Neutrophils (Absolute) 13.60 (H) 2.00 - 7.15 K/uL    Lymphs (Absolute) 0.77 (L) 1.00 - 4.80 K/uL    Monos (Absolute) 0.25 0.00 - 0.85 K/uL    Eos (Absolute) 0.00 0.00 - 0.51 K/uL    Baso (Absolute) 0.27 (H) 0.00 - 0.12 K/uL    NRBC (Absolute) 0.00 K/uL    Anisocytosis 2+     Microcytosis 2+    COMP METABOLIC PANEL   Result Value Ref Range    Sodium 132 (L) 135 - 145 mmol/L    Potassium 4.0 3.6 - 5.5 mmol/L    Chloride 100 96 - 112 mmol/L    Co2 19 (L) 20 - 33 mmol/L    Anion Gap 13.0 (H) 0.0 - 11.9    Glucose 121 (H) 65 - 99 mg/dL    Bun 24 (H) 8 - 22 mg/dL    Creatinine 1.03 0.50 - 1.40 mg/dL    Calcium 9.3 8.5 - 10.5 mg/dL    AST(SGOT) 17 12 - 45 U/L    ALT(SGPT) 18 2 - 50 U/L    Alkaline Phosphatase 106 (H) 30 - 99 U/L    Total Bilirubin 1.2 0.1 - 1.5 mg/dL    Albumin 5.1 (H) 3.2 - 4.9 g/dL    Total Protein 8.0 6.0 - 8.2 g/dL    Globulin 2.9 1.9 - 3.5 g/dL    A-G Ratio 1.8 g/dL   LIPASE   Result Value Ref Range    Lipase <3 (L) 11 - 82 U/L   URINALYSIS CULTURE, IF INDICATED   Result Value Ref Range    Color Yellow     Character Clear     Specific Gravity 1.031 <1.035    Ph 5.5 5.0 - 8.0    Glucose Negative Negative mg/dL    Ketones 80 (A) Negative mg/dL    Protein 100 (A) Negative mg/dL    Bilirubin Negative Negative    Urobilinogen, Urine 0.2 Negative    Nitrite Negative Negative    Leukocyte Esterase Negative Negative    Occult Blood Negative Negative    Micro Urine Req Microscopic    ESTIMATED GFR   Result Value Ref Range    GFR If African American >60 >60 mL/min/1.73 m 2    GFR If Non African American >60 >60 mL/min/1.73 m 2   URINE MICROSCOPIC (W/UA)   Result Value Ref Range    WBC  "0-2 /hpf    RBC 5-10 (A) /hpf    Bacteria Few (A) None /hpf    Epithelial Cells Few /hpf    Hyaline Cast 0-2 /lpf   DIFFERENTIAL MANUAL   Result Value Ref Range    Bands-Stabs 0.90 0.00 - 10.00 %    Manual Diff Status PERFORMED    PERIPHERAL SMEAR REVIEW   Result Value Ref Range    Peripheral Smear Review see below    PLATELET ESTIMATE   Result Value Ref Range    Plt Estimation Increased    MORPHOLOGY   Result Value Ref Range    RBC Morphology Present    POC URINE PREGNANCY   Result Value Ref Range    POC Urine Pregnancy Test Negative Negative       CHART REVIEW  Pertinent medical chart information was reviewed and reveals: No recent pertinent encounters  COURSE & MEDICAL DECISION MAKING  Pertinent Labs & Imaging studies reviewed. (See chart for details)    Differential diagnoses include but not limited to: Viral illness, and nausea, gastroenteritis, UTI, pyelonephritis, pancreatitis, cholecystitis    1:42 AM - Patient seen and examined at bedside. Discussed plan of care, including labs. Patient agrees to the plan of care. The patient will be resuscitated with 1L NS IV and medicated with Zofran 4 mg, GI Cocktail 30 mL, Pepcid 40 mg. Ordered for POC UA, POC Urine Pregnancy, CBC with differential, CMP, Lipase, UA to evaluate her symptoms.     4:46 AM - Patient was reevaluated at bedside. She is now tolerating PO's and has stable vital signs. Discussed lab results with the patient and informed them that they are stable for discharge. She is recommended to return for any new or worsening symptoms. The patient understands and agrees to be discharged.       Vitals:    02/04/19 0037 02/04/19 0045 02/04/19 0401   BP: 133/79     Pulse: (!) 117  (!) 101   Resp: 16     Temp: 36.7 °C (98.1 °F)     TempSrc: Temporal     SpO2: 98%  98%   Weight:  101.7 kg (224 lb 3.3 oz)    Height:  1.575 m (5' 2\")        Medications   NS infusion 2,000 mL (0 mL Intravenous Stopped 2/4/19 0405)   ondansetron (ZOFRAN) syringe/vial injection 4 mg " (4 mg Intravenous Given 2/4/19 0228)   hyoscyamine-maalox plus-lidocaine viscous (GI COCKTAIL) oral susp 30 mL (30 mL Oral Given 2/4/19 0332)   famotidine (PEPCID) tablet 40 mg (40 mg Oral Given 2/4/19 0229)       HYDRATION: Based on the patient's presentation of Dehydration the patient was given IV fluids. IV Hydration was used because oral hydration was not adequate alone. Upon recheck following hydration, the patient was improved.    33-year-old previously healthy female presenting for acute vomiting and diarrhea, epigastric pain that started after multiple episodes of vomiting.  No blood in vomit or stool.  Not tolerating p.o. Today.  Slightly tachycardic on arrival, hemodynamically stable, no pain on abdominal exam, stating epigastric pain, no evidence of acute surgical abdomen.  Very low suspicion of cholecystitis or appendicitis without focal pain history more likely to be viral syndrome.  Labs are unremarkable, appears dehydrated.  Patient feeling much better after fluids and gastritis treatment, no pain medications given.  Tolerating p.o. and with no current complaints, safe discharge and outpatient management.  Patient or guardian given strict returns precautions and care instructions.  Advised PCP follow-up in 1-2 days.  Patient or guardian expresses understanding and agrees to plan.    DISPOSITION  The patient will return for new or worsening symptoms and is stable at the time of discharge.    The patient is referred to a primary physician for blood pressure management, diabetic screening, and for all other preventative health concerns.    DISPOSITION:  Patient will be discharged home in stable condition.    FOLLOW UP:  HAYDEN BrasherR.N.  [address] [address] [phone]    Schedule an appointment as soon as possible for a visit in 2 days        OUTPATIENT MEDICATIONS:  New Prescriptions    FAMOTIDINE (PEPCID) 20 MG TAB    Take 1 Tab by mouth 2 times a day.     ONDANSETRON (ZOFRAN ODT) 4 MG TABLET DISPERSIBLE    Take 1 Tab by mouth every 8 hours as needed for Nausea.         FINAL IMPRESSION  Visit Diagnoses     ICD-10-CM   1. Vomiting and diarrhea R11.10    R19.7   2. Dehydration E86.0        Gopi CORMIER (Scribe), am scribing for, and in the presence of, Blake Bal M.D..    Electronically signed by: Gopi Gudino (Dasia), 2/4/2019    IBlake M.D. personally performed the services described in this documentation, as scribed by Gopi Gudino in my presence, and it is both accurate and complete.C    The note accurately reflects work and decisions made by me.  Blake Bal  2/4/2019  6:44 AM

## 2019-02-04 NOTE — ED TRIAGE NOTES
Cookie Wolfe  33 y.o.  Chief Complaint   Patient presents with   • Abdominal Cramping     since yesterday   • Nausea/Vomiting/Diarrhea     since yesterday     Ambulatory to triage with steady gait for above.    States that diarrhea started, then abdominal pain, then N/V.    States that diarrhea and emesis have been clear.    Patient vomiting in triage 200 mL clear emesis noted.    Took Zofran ODT at home PTA without relief.    Triage process explained to patient, apologized for wait time, and returned to lobby.

## 2019-05-07 ENCOUNTER — HOSPITAL ENCOUNTER (OUTPATIENT)
Dept: RADIOLOGY | Facility: MEDICAL CENTER | Age: 34
End: 2019-05-07
Attending: CHIROPRACTOR
Payer: COMMERCIAL

## 2019-05-07 DIAGNOSIS — M99.01 CERVICAL SEGMENT DYSFUNCTION: ICD-10-CM

## 2019-05-07 DIAGNOSIS — M99.02 THORACIC SEGMENT DYSFUNCTION: ICD-10-CM

## 2019-05-07 DIAGNOSIS — M99.03 SOMATIC DYSFUNCTION OF LUMBAR REGION: ICD-10-CM

## 2019-05-07 DIAGNOSIS — M99.04 SOMATIC DYSFUNCTION OF SACRAL REGION: ICD-10-CM

## 2019-05-07 PROCEDURE — 72072 X-RAY EXAM THORAC SPINE 3VWS: CPT

## 2019-05-07 PROCEDURE — 72100 X-RAY EXAM L-S SPINE 2/3 VWS: CPT

## 2019-05-07 PROCEDURE — 72170 X-RAY EXAM OF PELVIS: CPT

## 2019-05-07 PROCEDURE — 72040 X-RAY EXAM NECK SPINE 2-3 VW: CPT

## 2019-07-08 ENCOUNTER — HOSPITAL ENCOUNTER (OUTPATIENT)
Facility: MEDICAL CENTER | Age: 34
End: 2019-07-09
Attending: EMERGENCY MEDICINE | Admitting: INTERNAL MEDICINE
Payer: COMMERCIAL

## 2019-07-08 ENCOUNTER — APPOINTMENT (OUTPATIENT)
Dept: RADIOLOGY | Facility: MEDICAL CENTER | Age: 34
End: 2019-07-08
Attending: EMERGENCY MEDICINE
Payer: COMMERCIAL

## 2019-07-08 DIAGNOSIS — R20.0 NUMBNESS: ICD-10-CM

## 2019-07-08 DIAGNOSIS — M79.602 LEFT ARM PAIN: ICD-10-CM

## 2019-07-08 PROBLEM — E03.9 HYPOTHYROID: Status: ACTIVE | Noted: 2019-07-08

## 2019-07-08 PROBLEM — R07.9 CHEST PAIN: Status: ACTIVE | Noted: 2019-07-08

## 2019-07-08 PROBLEM — G93.5 CHIARI MALFORMATION TYPE I (HCC): Status: ACTIVE | Noted: 2019-07-08

## 2019-07-08 PROBLEM — Q07.01 CHIARI MALFORMATION TYPE II (HCC): Status: ACTIVE | Noted: 2019-07-08

## 2019-07-08 PROBLEM — R73.9 HYPERGLYCEMIA: Status: ACTIVE | Noted: 2019-07-08

## 2019-07-08 LAB
ALBUMIN SERPL BCP-MCNC: 4.2 G/DL (ref 3.2–4.9)
ALBUMIN/GLOB SERPL: 1.2 G/DL
ALP SERPL-CCNC: 80 U/L (ref 30–99)
ALT SERPL-CCNC: 16 U/L (ref 2–50)
ANION GAP SERPL CALC-SCNC: 9 MMOL/L (ref 0–11.9)
AST SERPL-CCNC: 19 U/L (ref 12–45)
BASOPHILS # BLD AUTO: 0.4 % (ref 0–1.8)
BASOPHILS # BLD: 0.04 K/UL (ref 0–0.12)
BILIRUB SERPL-MCNC: 0.6 MG/DL (ref 0.1–1.5)
BUN SERPL-MCNC: 14 MG/DL (ref 8–22)
CALCIUM SERPL-MCNC: 9 MG/DL (ref 8.4–10.2)
CHLORIDE SERPL-SCNC: 104 MMOL/L (ref 96–112)
CO2 SERPL-SCNC: 24 MMOL/L (ref 20–33)
CREAT SERPL-MCNC: 0.79 MG/DL (ref 0.5–1.4)
EKG IMPRESSION: NORMAL
EOSINOPHIL # BLD AUTO: 0.11 K/UL (ref 0–0.51)
EOSINOPHIL NFR BLD: 1.2 % (ref 0–6.9)
ERYTHROCYTE [DISTWIDTH] IN BLOOD BY AUTOMATED COUNT: 40 FL (ref 35.9–50)
GLOBULIN SER CALC-MCNC: 3.4 G/DL (ref 1.9–3.5)
GLUCOSE SERPL-MCNC: 95 MG/DL (ref 65–99)
HCT VFR BLD AUTO: 37.4 % (ref 37–47)
HGB BLD-MCNC: 11.3 G/DL (ref 12–16)
IMM GRANULOCYTES # BLD AUTO: 0.03 K/UL (ref 0–0.11)
IMM GRANULOCYTES NFR BLD AUTO: 0.3 % (ref 0–0.9)
LYMPHOCYTES # BLD AUTO: 2.51 K/UL (ref 1–4.8)
LYMPHOCYTES NFR BLD: 26.3 % (ref 22–41)
MAGNESIUM SERPL-MCNC: 2 MG/DL (ref 1.5–2.5)
MCH RBC QN AUTO: 20.7 PG (ref 27–33)
MCHC RBC AUTO-ENTMCNC: 30.2 G/DL (ref 33.6–35)
MCV RBC AUTO: 68.5 FL (ref 81.4–97.8)
MONOCYTES # BLD AUTO: 0.63 K/UL (ref 0–0.85)
MONOCYTES NFR BLD AUTO: 6.6 % (ref 0–13.4)
NEUTROPHILS # BLD AUTO: 6.23 K/UL (ref 2–7.15)
NEUTROPHILS NFR BLD: 65.2 % (ref 44–72)
NRBC # BLD AUTO: 0 K/UL
NRBC BLD-RTO: 0 /100 WBC
PHOSPHATE SERPL-MCNC: 3 MG/DL (ref 2.5–4.5)
PLATELET # BLD AUTO: 388 K/UL (ref 164–446)
PMV BLD AUTO: 9.7 FL (ref 9–12.9)
POTASSIUM SERPL-SCNC: 3.7 MMOL/L (ref 3.6–5.5)
PROT SERPL-MCNC: 7.6 G/DL (ref 6–8.2)
RBC # BLD AUTO: 5.46 M/UL (ref 4.2–5.4)
SODIUM SERPL-SCNC: 137 MMOL/L (ref 135–145)
TROPONIN I SERPL-MCNC: <0.02 NG/ML (ref 0–0.04)
TSH SERPL DL<=0.005 MIU/L-ACNC: 1.66 UIU/ML (ref 0.38–5.33)
WBC # BLD AUTO: 9.6 K/UL (ref 4.8–10.8)

## 2019-07-08 PROCEDURE — A9270 NON-COVERED ITEM OR SERVICE: HCPCS | Performed by: INTERNAL MEDICINE

## 2019-07-08 PROCEDURE — 83735 ASSAY OF MAGNESIUM: CPT

## 2019-07-08 PROCEDURE — 83036 HEMOGLOBIN GLYCOSYLATED A1C: CPT

## 2019-07-08 PROCEDURE — 96374 THER/PROPH/DIAG INJ IV PUSH: CPT

## 2019-07-08 PROCEDURE — 99220 PR INITIAL OBSERVATION CARE,LEVL III: CPT | Performed by: INTERNAL MEDICINE

## 2019-07-08 PROCEDURE — 700102 HCHG RX REV CODE 250 W/ 637 OVERRIDE(OP): Performed by: INTERNAL MEDICINE

## 2019-07-08 PROCEDURE — 84443 ASSAY THYROID STIM HORMONE: CPT

## 2019-07-08 PROCEDURE — 93005 ELECTROCARDIOGRAM TRACING: CPT | Performed by: EMERGENCY MEDICINE

## 2019-07-08 PROCEDURE — G0378 HOSPITAL OBSERVATION PER HR: HCPCS

## 2019-07-08 PROCEDURE — 36415 COLL VENOUS BLD VENIPUNCTURE: CPT

## 2019-07-08 PROCEDURE — 96376 TX/PRO/DX INJ SAME DRUG ADON: CPT

## 2019-07-08 PROCEDURE — 700111 HCHG RX REV CODE 636 W/ 250 OVERRIDE (IP): Performed by: INTERNAL MEDICINE

## 2019-07-08 PROCEDURE — 80053 COMPREHEN METABOLIC PANEL: CPT

## 2019-07-08 PROCEDURE — 71045 X-RAY EXAM CHEST 1 VIEW: CPT

## 2019-07-08 PROCEDURE — 84484 ASSAY OF TROPONIN QUANT: CPT

## 2019-07-08 PROCEDURE — 84100 ASSAY OF PHOSPHORUS: CPT

## 2019-07-08 PROCEDURE — 85025 COMPLETE CBC W/AUTO DIFF WBC: CPT

## 2019-07-08 PROCEDURE — 99285 EMERGENCY DEPT VISIT HI MDM: CPT

## 2019-07-08 RX ORDER — LORAZEPAM 2 MG/ML
2 INJECTION INTRAMUSCULAR
Status: COMPLETED | OUTPATIENT
Start: 2019-07-08 | End: 2019-07-09

## 2019-07-08 RX ORDER — PROMETHAZINE HYDROCHLORIDE 25 MG/1
12.5-25 TABLET ORAL EVERY 4 HOURS PRN
Status: DISCONTINUED | OUTPATIENT
Start: 2019-07-08 | End: 2019-07-09 | Stop reason: HOSPADM

## 2019-07-08 RX ORDER — HYDROXYZINE HYDROCHLORIDE 25 MG/1
25 TABLET, FILM COATED ORAL ONCE
Status: COMPLETED | OUTPATIENT
Start: 2019-07-08 | End: 2019-07-08

## 2019-07-08 RX ORDER — ACETAMINOPHEN 325 MG/1
650 TABLET ORAL EVERY 6 HOURS PRN
Status: DISCONTINUED | OUTPATIENT
Start: 2019-07-08 | End: 2019-07-09 | Stop reason: HOSPADM

## 2019-07-08 RX ORDER — ACETAMINOPHEN 500 MG
1000 TABLET ORAL EVERY 6 HOURS PRN
COMMUNITY
End: 2022-03-21

## 2019-07-08 RX ORDER — ONDANSETRON 2 MG/ML
4 INJECTION INTRAMUSCULAR; INTRAVENOUS EVERY 4 HOURS PRN
Status: DISCONTINUED | OUTPATIENT
Start: 2019-07-08 | End: 2019-07-09 | Stop reason: HOSPADM

## 2019-07-08 RX ORDER — LEVOTHYROXINE SODIUM 0.15 MG/1
150 TABLET ORAL
COMMUNITY
End: 2022-03-21

## 2019-07-08 RX ORDER — ONDANSETRON 4 MG/1
4 TABLET, ORALLY DISINTEGRATING ORAL EVERY 4 HOURS PRN
Status: DISCONTINUED | OUTPATIENT
Start: 2019-07-08 | End: 2019-07-09 | Stop reason: HOSPADM

## 2019-07-08 RX ORDER — FLUOXETINE HYDROCHLORIDE 20 MG/1
20 CAPSULE ORAL DAILY
Status: DISCONTINUED | OUTPATIENT
Start: 2019-07-09 | End: 2019-07-09 | Stop reason: HOSPADM

## 2019-07-08 RX ORDER — PHENTERMINE HYDROCHLORIDE 37.5 MG/1
37.5 CAPSULE ORAL EVERY MORNING
COMMUNITY
End: 2020-06-19

## 2019-07-08 RX ORDER — HYDROXYZINE HYDROCHLORIDE 25 MG/1
25 TABLET, FILM COATED ORAL 3 TIMES DAILY PRN
Status: DISCONTINUED | OUTPATIENT
Start: 2019-07-08 | End: 2019-07-09 | Stop reason: HOSPADM

## 2019-07-08 RX ORDER — OMEPRAZOLE 20 MG/1
40 CAPSULE, DELAYED RELEASE ORAL DAILY
Status: DISCONTINUED | OUTPATIENT
Start: 2019-07-09 | End: 2019-07-09 | Stop reason: HOSPADM

## 2019-07-08 RX ORDER — OMEPRAZOLE 40 MG/1
40 CAPSULE, DELAYED RELEASE ORAL DAILY
COMMUNITY
End: 2021-12-02

## 2019-07-08 RX ORDER — KETOROLAC TROMETHAMINE 30 MG/ML
30 INJECTION, SOLUTION INTRAMUSCULAR; INTRAVENOUS EVERY 6 HOURS PRN
Status: DISCONTINUED | OUTPATIENT
Start: 2019-07-08 | End: 2019-07-09 | Stop reason: HOSPADM

## 2019-07-08 RX ORDER — PROMETHAZINE HYDROCHLORIDE 25 MG/1
12.5-25 SUPPOSITORY RECTAL EVERY 4 HOURS PRN
Status: DISCONTINUED | OUTPATIENT
Start: 2019-07-08 | End: 2019-07-09 | Stop reason: HOSPADM

## 2019-07-08 RX ORDER — LEVOTHYROXINE SODIUM 0.05 MG/1
150 TABLET ORAL
Status: DISCONTINUED | OUTPATIENT
Start: 2019-07-09 | End: 2019-07-09 | Stop reason: HOSPADM

## 2019-07-08 RX ORDER — KETOROLAC TROMETHAMINE 30 MG/ML
30 INJECTION, SOLUTION INTRAMUSCULAR; INTRAVENOUS ONCE
Status: COMPLETED | OUTPATIENT
Start: 2019-07-08 | End: 2019-07-08

## 2019-07-08 RX ADMIN — KETOROLAC TROMETHAMINE 30 MG: 30 INJECTION, SOLUTION INTRAMUSCULAR at 17:21

## 2019-07-08 RX ADMIN — ENOXAPARIN SODIUM 40 MG: 100 INJECTION SUBCUTANEOUS at 17:21

## 2019-07-08 RX ADMIN — KETOROLAC TROMETHAMINE 30 MG: 30 INJECTION, SOLUTION INTRAMUSCULAR at 14:49

## 2019-07-08 RX ADMIN — KETOROLAC TROMETHAMINE 30 MG: 30 INJECTION, SOLUTION INTRAMUSCULAR at 23:28

## 2019-07-08 RX ADMIN — HYDROXYZINE HYDROCHLORIDE 25 MG: 25 TABLET, FILM COATED ORAL at 14:48

## 2019-07-08 ASSESSMENT — ENCOUNTER SYMPTOMS
WEIGHT LOSS: 0
VOMITING: 0
DIAPHORESIS: 0
WEAKNESS: 0
COUGH: 0
DIARRHEA: 0
DIZZINESS: 0
SPEECH CHANGE: 0
SHORTNESS OF BREATH: 0
DEPRESSION: 0
EYE REDNESS: 0
NERVOUS/ANXIOUS: 1
NAUSEA: 0
ABDOMINAL PAIN: 0
SORE THROAT: 0
BLURRED VISION: 0
CONSTIPATION: 0
FOCAL WEAKNESS: 1
CHILLS: 0
HEADACHES: 1
SENSORY CHANGE: 1
FEVER: 0
TINGLING: 1

## 2019-07-08 ASSESSMENT — LIFESTYLE VARIABLES
EVER_SMOKED: YES
ALCOHOL_USE: NO
EVER_SMOKED: NEVER
SUBSTANCE_ABUSE: 0

## 2019-07-08 ASSESSMENT — COGNITIVE AND FUNCTIONAL STATUS - GENERAL
SUGGESTED CMS G CODE MODIFIER DAILY ACTIVITY: CH
MOBILITY SCORE: 24
DAILY ACTIVITIY SCORE: 24
SUGGESTED CMS G CODE MODIFIER MOBILITY: CH

## 2019-07-08 ASSESSMENT — PATIENT HEALTH QUESTIONNAIRE - PHQ9
SUM OF ALL RESPONSES TO PHQ9 QUESTIONS 1 AND 2: 0
2. FEELING DOWN, DEPRESSED, IRRITABLE, OR HOPELESS: NOT AT ALL
1. LITTLE INTEREST OR PLEASURE IN DOING THINGS: NOT AT ALL

## 2019-07-08 NOTE — ED NOTES
recvd pt in no apparent distress, though c/o  intermitt lower jaw pain and left arm pain and numbness since 0700 this am along with left sided chest pain. Denies injury , diaphoresis or hx of same. Chest pain protocol initiated, ekg in process, saline lock with blood draw, sr on moniter. Update to erp. Verbal assurance provided to pt

## 2019-07-08 NOTE — ED NOTES
Pt [id number] for void. Med per order, in no apparent distress with continued 10/10 left arm pain. hospitalist to bedside

## 2019-07-08 NOTE — H&P
Hospital Medicine History & Physical Note    Date of Service  7/8/2019    Primary Care Physician  Roseanne Larson    Consultants  none    Code Status  Full    Chief Complaint  Left arm numb/ pain    History of Presenting Illness  34 y.o. female with past medical history of surgical hypothyroidism, Chiari I malformation who presented 7/8/2019 with sudden onset of left arm pain and numbness radiating to her left jaw.  The ER physician also stated the patient complained of chest pain but the patient denies this to me and says she never had any chest pain.  She complained to the nurse of left leg weakness and numbness but was observed walking to the bathroom independently without difficulty.  The patient denies that she ever complained of any symptoms in her left leg to me.  She describes the pain as centering around her antecubital area and pounding and throbbing radiating all the way down to her wrist as well as to her mid bicep area, she also complains of pain around her bilateral mandible area that is of a lesser nature.  She has not found any exacerbating or relieving factors she has had no recent injuries.  She states her left arm also feels weak and it was tingling but is no longer tingling.  She says the pain is 11-12 out of 10 in intensity but is in absolutely no visible distress at all.  She tends to keep the arm at rest at her side and she is left-handed.  However during the physical exam she uses her left arm without difficulty when maneuvering on the gurney.  She also complains of an occipital discomfort like she is getting a headache but did not get a headache and that has since resolved.  The jaw pain lasted for about 2 hours but resolved an hour ago she still has her arm symptoms/    Review of Systems  Review of Systems   Constitutional: Negative for chills, diaphoresis, fever, malaise/fatigue and weight loss.   HENT: Negative for congestion and sore throat.    Eyes: Negative for blurred vision and  redness.   Respiratory: Negative for cough and shortness of breath.    Cardiovascular: Negative for chest pain (See HPI).   Gastrointestinal: Negative for abdominal pain, constipation, diarrhea, nausea and vomiting.   Genitourinary: Negative for dysuria.   Musculoskeletal:        Left arm pain   Skin: Negative for itching and rash.   Neurological: Positive for tingling, sensory change, focal weakness and headaches. Negative for dizziness, speech change and weakness.   Psychiatric/Behavioral: Negative for depression and substance abuse. The patient is nervous/anxious.         Claustrophobia       Past Medical History   has a past medical history of Disorder of thyroid; Heart burn; Psychiatric problem; and Snoring. She also has no past medical history of Asthma or Type II or unspecified type diabetes mellitus without mention of complication, not stated as uncontrolled.  Chiari I malformation    Surgical History   has a past surgical history that includes other; other; and thyroidectomy total (5/17/2017).  Goiter surgery at the age of 4, 2 sets of tympanostomy tubes    Family History  family history includes Hypertension in her father and mother; Other in her father.  Thromboembolic disease in her father and aunt, hypertension, leukemia    Social History   reports that she quit smoking about 2 years ago. She quit after 12.00 years of use. She has never used smokeless tobacco. She reports that she does not drink alcohol or use drugs.  She works in [employer], she is a 20-year-old son, she lives part-time between her mother's home and her boyfriend's home.    Allergies  Allergies   Allergen Reactions   • Tramadol Itching   • Vicodin [Hydrocodone-Acetaminophen]      Gets super hyper       Medications  Prior to Admission Medications   Prescriptions Last Dose Informant Patient Reported? Taking?   FLUoxetine (PROZAC) 20 MG Cap 7/8/2019 at 0500 Patient Yes No   Sig: Take 20 mg by mouth every day.   acetaminophen (TYLENOL) 500 MG Tab  7/8/2019 at 0800 Patient Yes Yes   Sig: Take 1,000 mg by mouth every 6 hours as needed for Moderate Pain.   levothyroxine (SYNTHROID) 150 MCG Tab 7/8/2019 at 0500 Patient Yes Yes   Sig: Take 150 mcg by mouth Every morning on an empty stomach.   omeprazole (PRILOSEC) 40 MG delayed-release capsule 7/8/2019 at 0500 Patient Yes Yes   Sig: Take 40 mg by mouth every day.   phentermine 37.5 MG capsule 7/8/2019 at 0500 Patient Yes Yes   Sig: Take 37.5 mg by mouth every morning.      Facility-Administered Medications: None       Physical Exam  Temp:  [36.9 °C (98.4 °F)-37.2 °C (99 °F)] 36.9 °C (98.4 °F)  Pulse:  [69-93] 80  Resp:  [12-26] 18  BP: (131-143)/(75-96) 131/75  SpO2:  [97 %-99 %] 99 %    Physical Exam   Constitutional: She is oriented to person, place, and time. She appears well-developed. No distress.   Obese, no acute distress   HENT:   Head: Normocephalic and atraumatic.   Right Ear: External ear normal.   Left Ear: External ear normal.   Nose: Nose normal.   Mouth/Throat: Oropharynx is clear and moist.   Eyes: Pupils are equal, round, and reactive to light. Conjunctivae and EOM are normal. Right eye exhibits no discharge. Left eye exhibits no discharge. No scleral icterus.   Neck: Neck supple.   Cardiovascular: Normal rate and regular rhythm.    Pulmonary/Chest: Effort normal and breath sounds normal.   Abdominal: Soft. Bowel sounds are normal. She exhibits no distension. There is no tenderness.   Musculoskeletal: She exhibits no edema or tenderness.   Neurological: She is alert and oriented to person, place, and time. No cranial nerve deficit.   Skin: Skin is warm and dry. She is not diaphoretic.   Psychiatric:   Affect does not match chief complaint   Nursing note and vitals reviewed.      Laboratory:  Recent Labs      07/08/19   1140   WBC  9.6   RBC  5.46*   HEMOGLOBIN  11.3*   HEMATOCRIT  37.4   MCV  68.5*   MCH  20.7*   MCHC  30.2*   RDW  40.0   PLATELETCT  388   MPV  9.7     Recent Labs      07/08/19    1140   SODIUM  137   POTASSIUM  3.7   CHLORIDE  104   CO2  24   GLUCOSE  95   BUN  14   CREATININE  0.79   CALCIUM  9.0     Recent Labs      07/08/19   1140   ALTSGPT  16   ASTSGOT  19   ALKPHOSPHAT  80   TBILIRUBIN  0.6   GLUCOSE  95                 Recent Labs      07/08/19   1140   TROPONINI  <0.02       Urinalysis:    No results found     Imaging:  DX-CHEST-PORTABLE (1 VIEW)   Final Result      Upper normal heart size without consolidation identified      Left lung base nodule is larger than expected for a nipple shadow although recommend clinical correlation. If there is no clinical finding to explain this nodule, CT is recommended      MR-BRAIN-WITH & W/O    (Results Pending)         Assessment/Plan:  I anticipate this patient is appropriate for observation status at this time.    Hyperglycemia   Assessment & Plan    Check A1c       Hypothyroid   Assessment & Plan    Continue home meds TSH OK     Chiari malformation type I (HCC)   Assessment & Plan    Mild per patient- sees [facility] neurosurgery     Chest pain   Assessment & Plan    Denies telling ERP this     Left arm numbness   Assessment & Plan    And pain  Patient states pain is 11-12/10 but exhibits no distress at all  No indcation for opiate medication  Continue toradol     Depression- (present on admission)   Assessment & Plan    Continue prozac and prn atarax          VTE prophylaxis: Lovenox (+ [facility])

## 2019-07-08 NOTE — ASSESSMENT & PLAN NOTE
And pain  Patient states pain is 11-12/10 but exhibits no distress at all  No indcation for opiate medication  Continue toradol

## 2019-07-08 NOTE — ED PROVIDER NOTES
"ED Provider Note    CHIEF COMPLAINT  Chief Complaint   Patient presents with   • Numbness     Left upper arm to fingers, started at 0700 this am. Denies injury. Pt reports \"sharp pain that comes and goes on both sides of my lower jaw\"       HPI  Cookie Wolfe is a 34 y.o. female with a history of GERD, Arnold-Chiari malformation and thyroid disorder who presents complaining of left arm and jaw pain.    Pain/heaviness and numbness of left upper arm began during her drive to work.  Left upper arm and hand currently feels tingly.    Jaw pain is intermittent and bilateral.  Patient describes the pain as a soreness and tender to the touch.    She denies aggravating or alleviating factors of either of these complaints.    Headache was noted this morning.  Pain is present in the frontal area and right side of her neck radiating into the base of her skull.      She states \"I feel weird.\"  She just does not feel right.    Patient admits to being near syncopal.    Patient admits to nausea but denies shortness of breath, vomiting, diaphoresis, trauma, chest pain.    ALLERGIES  Allergies   Allergen Reactions   • Tramadol Itching   • Vicodin [Hydrocodone-Acetaminophen]      Gets super hyper       CURRENT MEDICATIONS  Home Medications     Reviewed by Pete Parra R.N. (Registered Nurse) on 07/08/19 at 2334  Med List Status: Complete   Medication Last Dose Status   acetaminophen (TYLENOL) 500 MG Tab 7/8/2019 Active   FLUoxetine (PROZAC) 20 MG Cap 7/8/2019 Active   levothyroxine (SYNTHROID) 150 MCG Tab 7/8/2019 Active   omeprazole (PRILOSEC) 40 MG delayed-release capsule 7/8/2019 Active   phentermine 37.5 MG capsule 7/8/2019 Active                PAST MEDICAL HISTORY   has a past medical history of Disorder of thyroid; Heart burn; Psychiatric problem; and Snoring.    SURGICAL HISTORY   has a past surgical history that includes other; other; and thyroidectomy total (5/17/2017).    SOCIAL HISTORY  Social History " "    Social History Main Topics   • Smoking status: Former Smoker     Years: 12.00     Quit date: 3/1/2017   • Smokeless tobacco: Never Used      Comment: 3-4 daily   • Alcohol use No   • Drug use: No   • Sexual activity: Not on file     Last used drugs in 2010--marijuana    Family Hx:  Father and sister with PE  No CAD or CVA    REVIEW OF SYSTEMS  See HPI for further details.  All other systems are negative except as above in HPI.    PHYSICAL EXAM  VITAL SIGNS: /96   Pulse 90   Temp 37.2 °C (99 °F) (Temporal)   Resp 18   Ht 1.575 m (5' 2\")   Wt 98.2 kg (216 lb 7.9 oz)   LMP 06/19/2019 (Approximate)   SpO2 98%   BMI 39.60 kg/m²     General:  WDWN, nontoxic appearing in NAD; A+Ox3; V/S as above   Skin: warm and dry; good color; no rash  HEENT: NCAT; EOMs intact; PERRL; no scleral icterus   Neck: FROM; soft; no masses or crepitus submandibularly  Cardiovascular: Regular heart rate and rhythm.  No murmurs, rubs, or gallops; pulses 2+ bilaterally radially and DP areas  Lungs: Clear to auscultation with good air movement bilaterally.  No wheezes, rhonchi, or rales.   Abdomen: BS present; soft; NTND; no rebound, guarding, or rigidity.  No organomegaly or pulsatile mass  Extremities: UNDERWOOD x 4; no e/o trauma; no pedal edema; neg Kina's  Neurologic: CNs III-XII grossly intact; speech clear; distal sensation intact; strength 5/5 UE/LEs  Psychiatric: Appropriate affect, normal mood    LABS  Results for orders placed or performed during the hospital encounter of 07/08/19   CBC with Differential   Result Value Ref Range    WBC 9.6 4.8 - 10.8 K/uL    RBC 5.46 (H) 4.20 - 5.40 M/uL    Hemoglobin 11.3 (L) 12.0 - 16.0 g/dL    Hematocrit 37.4 37.0 - 47.0 %    MCV 68.5 (L) 81.4 - 97.8 fL    MCH 20.7 (L) 27.0 - 33.0 pg    MCHC 30.2 (L) 33.6 - 35.0 g/dL    RDW 40.0 35.9 - 50.0 fL    Platelet Count 388 164 - 446 K/uL    MPV 9.7 9.0 - 12.9 fL    Neutrophils-Polys 65.20 44.00 - 72.00 %    Lymphocytes 26.30 22.00 - 41.00 %    " Monocytes 6.60 0.00 - 13.40 %    Eosinophils 1.20 0.00 - 6.90 %    Basophils 0.40 0.00 - 1.80 %    Immature Granulocytes 0.30 0.00 - 0.90 %    Nucleated RBC 0.00 /100 WBC    Neutrophils (Absolute) 6.23 2.00 - 7.15 K/uL    Lymphs (Absolute) 2.51 1.00 - 4.80 K/uL    Monos (Absolute) 0.63 0.00 - 0.85 K/uL    Eos (Absolute) 0.11 0.00 - 0.51 K/uL    Baso (Absolute) 0.04 0.00 - 0.12 K/uL    Immature Granulocytes (abs) 0.03 0.00 - 0.11 K/uL    NRBC (Absolute) 0.00 K/uL   Complete Metabolic Panel (CMP)   Result Value Ref Range    Sodium 137 135 - 145 mmol/L    Potassium 3.7 3.6 - 5.5 mmol/L    Chloride 104 96 - 112 mmol/L    Co2 24 20 - 33 mmol/L    Anion Gap 9.0 0.0 - 11.9    Glucose 95 65 - 99 mg/dL    Bun 14 8 - 22 mg/dL    Creatinine 0.79 0.50 - 1.40 mg/dL    Calcium 9.0 8.4 - 10.2 mg/dL    AST(SGOT) 19 12 - 45 U/L    ALT(SGPT) 16 2 - 50 U/L    Alkaline Phosphatase 80 30 - 99 U/L    Total Bilirubin 0.6 0.1 - 1.5 mg/dL    Albumin 4.2 3.2 - 4.9 g/dL    Total Protein 7.6 6.0 - 8.2 g/dL    Globulin 3.4 1.9 - 3.5 g/dL    A-G Ratio 1.2 g/dL   Troponin   Result Value Ref Range    Troponin I <0.02 0.00 - 0.04 ng/mL   ESTIMATED GFR   Result Value Ref Range    GFR If African American >60 >60 mL/min/1.73 m 2    GFR If Non African American >60 >60 mL/min/1.73 m 2   MAGNESIUM   Result Value Ref Range    Magnesium 2.0 1.5 - 2.5 mg/dL   PHOSPHORUS   Result Value Ref Range    Phosphorus 3.0 2.5 - 4.5 mg/dL   TSH   Result Value Ref Range    TSH 1.660 0.380 - 5.330 uIU/mL   HEMOGLOBIN A1C   Result Value Ref Range    Glycohemoglobin 5.2 0.0 - 5.6 %    Est Avg Glucose 103 mg/dL   Renal Function Panel   Result Value Ref Range    Sodium 140 135 - 145 mmol/L    Potassium 3.8 3.6 - 5.5 mmol/L    Chloride 107 96 - 112 mmol/L    Co2 25 20 - 33 mmol/L    Glucose 75 65 - 99 mg/dL    Creatinine 0.92 0.50 - 1.40 mg/dL    Bun 15 8 - 22 mg/dL    Calcium 8.9 8.4 - 10.2 mg/dL    Phosphorus 3.0 2.5 - 4.5 mg/dL    Albumin 3.9 3.2 - 4.9 g/dL   ESTIMATED  GFR   Result Value Ref Range    GFR If African American >60 >60 mL/min/1.73 m 2    GFR If Non African American >60 >60 mL/min/1.73 m 2   EKG   Result Value Ref Range    Report       [facility] Emergency Dept.    Test Date:  2019  Pt Name:    COOKIE WOLFE         Department: EDSM  MRN:        [id number]                      [facility]  Gender:     Female                       Technician: HRR  :        [id number]                   Requested By:MARILEE XAVIER  Order #:    [id number]                    Reading MD: MARILEE XAVIER MD    Measurements  Intervals                                Axis  Rate:       82                           P:          39  KY:         108                          QRS:        7  QRSD:       82                           T:          9  QT:         380  QTc:        444    Interpretive Statements  SINUS RHYTHM  SHORT KY INTERVAL, ACCELERATED AV CONDUCTION  Compared to ECG 2017 10:54:15  T-wave abnormality no longer present    Electronically Signed On 2019 12:27:49 PDT by MARILEE XAVIER MD         IMAGING  DX-CHEST-PORTABLE (1 VIEW)   Final Result      Upper normal heart size without consolidation identified      Left lung base nodule is larger than expected for a nipple shadow although recommend clinical correlation. If there is no clinical finding to explain this nodule, CT is recommended        MEDICAL RECORD  I have reviewed patient's medical record and pertinent results are listed below.      COURSE & MEDICAL DECISION MAKING  I have reviewed any medical record information, laboratory studies and radiographic results as noted.    Cookie Wolfe is a 34 y.o. female who presents complaining of multiple symptoms including left arm pain and jaw pain.  Concern for ACS but also considered radiculopathy and CVA/TIA.  Patient has no focal neurologic findings on my exam.  EKG demonstrates no ST changes.  Labs demonstrated no  anemia, no elect light abnormality, no evidence of dehydration, a negative troponin and normal TSH.    I feel the patient would benefit from being admitted for further evaluation of possible atypical ACS symptoms and her neurologic symptoms.    I discussed the case with Dr. Cotton who agrees to admit the patient.    FINAL IMPRESSION  1. Numbness    2. Left arm pain      Electronically signed by: Tameka Palafox, 7/8/2019 11:31 AM

## 2019-07-08 NOTE — ED NOTES
Med rec updated and complete  Allergies reviewed  Pt reports no vitamins.  Pt reports no antibiotics in the last 2 weeks.

## 2019-07-08 NOTE — PROGRESS NOTES
"Pt arrived C/O LUE pain and numbness. LUE numbness is mild, but impairs sensation between sharp and dull objects; allows for pressure and pain, heat and cold. ROM is unaffected, but patient did display some possible reduction in speed when attempting Pt-to-RN finger to finger test, without a reduction in accuracy.  strength was mildly reduced, but overall limb strength was good vs resistance. No other limbs were affected. Cranial nerves are intact. Pt did speak to a headache that started 1.5 hours after the LUE pain, but that was resolved with Tylenol. The reported jaw/neck pain started in the ER, and it resolved there - but was mainly provoked by the ER MD palpating the area. The Pt reported no pain when the same examination was performed bedside on this unit, and no swelling or mass was detected.     Under observation, the Pt does seem moderately anxious, without actual agitation, and is prescribed Prozac. Unsure of psychosomatic possibility, but Pt does not to be in pain per her numerical score of 7/10. She did report the Toradol \"took the edge off\" the pain. The MD was contacted regarding further pain management, and it was determined to provide further doses to see if there was an increase in efficacy. MRI was contacted, and an 1800 time slot was arranged. The Pt will be pre-medicated with her PRN options. At this time, the Pt is resting comfortably, denies CP, SOB, N&V, is AOx4, and has been informed of and agreed with the POC set forth by the MD and this RN. Bed low and locked, call bell in reach. Packaged meal provided to the patient, while she waits for her dinner.  "

## 2019-07-09 ENCOUNTER — APPOINTMENT (OUTPATIENT)
Dept: RADIOLOGY | Facility: MEDICAL CENTER | Age: 34
End: 2019-07-09
Attending: INTERNAL MEDICINE
Payer: COMMERCIAL

## 2019-07-09 ENCOUNTER — PATIENT OUTREACH (OUTPATIENT)
Dept: HEALTH INFORMATION MANAGEMENT | Facility: OTHER | Age: 34
End: 2019-07-09

## 2019-07-09 VITALS
SYSTOLIC BLOOD PRESSURE: 137 MMHG | OXYGEN SATURATION: 95 % | RESPIRATION RATE: 18 BRPM | HEIGHT: 62 IN | TEMPERATURE: 98.4 F | WEIGHT: 227.07 LBS | BODY MASS INDEX: 41.79 KG/M2 | DIASTOLIC BLOOD PRESSURE: 70 MMHG | HEART RATE: 78 BPM

## 2019-07-09 LAB
ALBUMIN SERPL BCP-MCNC: 3.9 G/DL (ref 3.2–4.9)
BUN SERPL-MCNC: 15 MG/DL (ref 8–22)
CALCIUM SERPL-MCNC: 8.9 MG/DL (ref 8.4–10.2)
CHLORIDE SERPL-SCNC: 107 MMOL/L (ref 96–112)
CO2 SERPL-SCNC: 25 MMOL/L (ref 20–33)
CREAT SERPL-MCNC: 0.92 MG/DL (ref 0.5–1.4)
EST. AVERAGE GLUCOSE BLD GHB EST-MCNC: 103 MG/DL
GLUCOSE SERPL-MCNC: 75 MG/DL (ref 65–99)
HBA1C MFR BLD: 5.2 % (ref 0–5.6)
PHOSPHATE SERPL-MCNC: 3 MG/DL (ref 2.5–4.5)
POTASSIUM SERPL-SCNC: 3.8 MMOL/L (ref 3.6–5.5)
SODIUM SERPL-SCNC: 140 MMOL/L (ref 135–145)

## 2019-07-09 PROCEDURE — A9270 NON-COVERED ITEM OR SERVICE: HCPCS | Performed by: INTERNAL MEDICINE

## 2019-07-09 PROCEDURE — 99217 PR OBSERVATION CARE DISCHARGE: CPT | Performed by: INTERNAL MEDICINE

## 2019-07-09 PROCEDURE — A9585 GADOBUTROL INJECTION: HCPCS | Performed by: INTERNAL MEDICINE

## 2019-07-09 PROCEDURE — 70553 MRI BRAIN STEM W/O & W/DYE: CPT

## 2019-07-09 PROCEDURE — 96376 TX/PRO/DX INJ SAME DRUG ADON: CPT

## 2019-07-09 PROCEDURE — 700102 HCHG RX REV CODE 250 W/ 637 OVERRIDE(OP): Performed by: INTERNAL MEDICINE

## 2019-07-09 PROCEDURE — G0378 HOSPITAL OBSERVATION PER HR: HCPCS

## 2019-07-09 PROCEDURE — 700111 HCHG RX REV CODE 636 W/ 250 OVERRIDE (IP): Performed by: INTERNAL MEDICINE

## 2019-07-09 PROCEDURE — 700117 HCHG RX CONTRAST REV CODE 255: Performed by: INTERNAL MEDICINE

## 2019-07-09 PROCEDURE — 80069 RENAL FUNCTION PANEL: CPT

## 2019-07-09 PROCEDURE — 71250 CT THORAX DX C-: CPT

## 2019-07-09 PROCEDURE — 96375 TX/PRO/DX INJ NEW DRUG ADDON: CPT

## 2019-07-09 RX ORDER — GABAPENTIN 100 MG/1
100 CAPSULE ORAL 3 TIMES DAILY
Status: DISCONTINUED | OUTPATIENT
Start: 2019-07-09 | End: 2019-07-09 | Stop reason: HOSPADM

## 2019-07-09 RX ORDER — GADOBUTROL 604.72 MG/ML
10 INJECTION INTRAVENOUS ONCE
Status: COMPLETED | OUTPATIENT
Start: 2019-07-09 | End: 2019-07-09

## 2019-07-09 RX ORDER — GABAPENTIN 100 MG/1
100 CAPSULE ORAL 3 TIMES DAILY
Qty: 90 CAP | Refills: 1 | Status: SHIPPED | OUTPATIENT
Start: 2019-07-09 | End: 2020-06-19

## 2019-07-09 RX ADMIN — LEVOTHYROXINE SODIUM 150 MCG: 50 TABLET ORAL at 05:29

## 2019-07-09 RX ADMIN — GABAPENTIN 100 MG: 100 CAPSULE ORAL at 13:01

## 2019-07-09 RX ADMIN — FLUOXETINE 20 MG: 20 CAPSULE ORAL at 05:29

## 2019-07-09 RX ADMIN — KETOROLAC TROMETHAMINE 30 MG: 30 INJECTION, SOLUTION INTRAMUSCULAR at 05:32

## 2019-07-09 RX ADMIN — OMEPRAZOLE 40 MG: 20 CAPSULE, DELAYED RELEASE ORAL at 05:29

## 2019-07-09 RX ADMIN — ENOXAPARIN SODIUM 40 MG: 100 INJECTION SUBCUTANEOUS at 05:31

## 2019-07-09 RX ADMIN — GADOBUTROL 10 ML: 604.72 INJECTION INTRAVENOUS at 08:55

## 2019-07-09 RX ADMIN — LORAZEPAM 2 MG: 2 INJECTION INTRAMUSCULAR; INTRAVENOUS at 08:11

## 2019-07-09 RX ADMIN — ACETAMINOPHEN 650 MG: 325 TABLET, FILM COATED ORAL at 10:22

## 2019-07-09 RX ADMIN — HYDROXYZINE HYDROCHLORIDE 25 MG: 25 TABLET, FILM COATED ORAL at 07:27

## 2019-07-09 NOTE — PROGRESS NOTES
"Pt assessed and plan of care discussed.  Pt c/o L arm pain from just above her elbow to her hand.  She described the pain as, \"the feeling you get right as you hit your finger with a hammer\" and says the pain pulsates.  Pt states that she has some numbness in the fingers of her L hand.  Discussed plan of care with pt including medicating with Toradol when next available and AM MRI.  All pt needs met at this time.   "

## 2019-07-09 NOTE — DISCHARGE INSTRUCTIONS
Discharge Instructions    Discharged to home by car with relative. Discharged via wheelchair, hospital escort: Yes.  Special equipment needed: Not Applicable    Be sure to schedule a follow-up appointment with your primary care doctor or any specialists as instructed.     Discharge Plan:   Influenza Vaccine Indication: Patient Refuses    I understand that a diet low in cholesterol, fat, and sodium is recommended for good health. Unless I have been given specific instructions below for another diet, I accept this instruction as my diet prescription.   Other diet: keep yourself hydrated.    Special Instructions:       Pulmonary Nodule  A pulmonary nodule is a small, round spot in your lung. It is usually found when pictures of your lungs are taken for other reasons. Most pulmonary nodules are not cancerous and do not cause symptoms. Tests will be done to make sure the nodule is not cancerous. Pulmonary nodules that are not cancerous usually do not require treatment.  Follow these instructions at home:  · Only take medicine as told by your doctor.  · Follow up with your doctor as told.  Contact a doctor if:  · You have trouble breathing when doing activities.  · You feel sick or more tired than normal.  · You do not feel like eating.  · You lose weight without trying to.  · You have chills.  · You have night sweats.  Get help right away if:  · You cannot catch your breath.  · You start making whistling sounds when breathing (wheezing).  · You have a cough that does not go away.  · You cough up blood.  · You are dizzy or feel like you are going to pass out.  · You have sudden chest pain.  · You have a fever or lasting symptoms for more than 2-3 days.  · You have a fever and your symptoms suddenly get worse.  This information is not intended to replace advice given to you by your health care provider. Make sure you discuss any questions you have with your health care provider.  Document Released: 01/20/2012 Document  Revised: 05/25/2017 Document Reviewed: 06/09/2014  [employer] Patient Education © 2017 [employer]        · Is patient discharged on Warfarin / Coumadin?   No     Depression / Suicide Risk    As you are discharged from this [facility] facility, it is important to learn how to keep safe from harming yourself.    Recognize the warning signs:  · Abrupt changes in personality, positive or negative- including increase in energy   · Giving away possessions  · Change in eating patterns- significant weight changes-  positive or negative  · Change in sleeping patterns- unable to sleep or sleeping all the time   · Unwillingness or inability to communicate  · Depression  · Unusual sadness, discouragement and loneliness  · Talk of wanting to die  · Neglect of personal appearance   · Rebelliousness- reckless behavior  · Withdrawal from people/activities they love  · Confusion- inability to concentrate     If you or a loved one observes any of these behaviors or has concerns about self-harm, here's what you can do:  · Talk about it- your feelings and reasons for harming yourself  · Remove any means that you might use to hurt yourself (examples: pills, rope, extension cords, firearm)  · Get professional help from the community (Mental Health, Substance Abuse, psychological counseling)  · Do not be alone:Call your Safe Contact- someone whom you trust who will be there for you.  · Call your local CRISIS HOTLINE 503-7488 or 069-706-8234  · Call your local Children's Mobile Crisis Response Team [place] (880) 437-2588 or www.uGift  · Call the toll free National Suicide Prevention Hotlines   · National Suicide Prevention Lifeline 241-597-IJTM (8546)  · National Hope Line Network 800-SUICIDE (325-9646)

## 2019-07-09 NOTE — DIETARY
NUTRITION SERVICES: BMI - Pt with BMI >40 (=Body mass index is 41.53 kg/m².), class III (extreme) obesity. Weight loss counseling not appropriate in acute care setting. RECOMMEND - Referral to outpatient nutrition services for weight management after D/C.

## 2019-07-09 NOTE — PROGRESS NOTES
MRI called and said they would have to do it tomorrow, 0800. Important to note, please premedicate patient. One time Ativan and Atarax are available.

## 2019-07-09 NOTE — PROGRESS NOTES
Telemetry Shift Summary    Rhythm SR/accelerated junctional  HR Range 70s-90s  Ectopy none  Measurements 0.10/0.10/0.34        Normal Values  Rhythm SR  HR Range    Measurements 0.12-0.20 / 0.06-0.10  / 0.30-0.52

## 2019-07-09 NOTE — PROGRESS NOTES
Report received from KATALINA Alexander. Plan of care discussed. Patient resting comfortably in bed, declines any further needs at this time. Safety precautions in place.

## 2019-07-09 NOTE — PROGRESS NOTES
Report received from Ryley about 0730 after he medicated her for her MRI.    [facility] pre-medicated with Ativan for her MRI about 0800 and pt left with transport.  New orders received for CT and called to Isaac who was going to get her after her MRI.      Pt returned from her testing about 1000.  Pt asking about her diet and why; explained running A1C.  She was also asking about different pain medications; pt willing to try tylenol for her pain.    Dr. Moore rounded and answering her questions; diet changed to regular.    CT results came back and Dr. Moore discussed the results with her and her family.  And discharge orders received.    Discharging Patient home per physician order.  Discharged with  to home at 1430.  Demonstrated understanding of discharge instructions, follow up appointments, home medications, prescriptions sent to [facility], and nursing care instructions for pulmonary nodules.  Ambulating without assistance, voiding without difficulty, pain well controlled, tolerating oral medications, oxygen saturation greater than 90% , tolerating diet.   Educational handouts given and discussed.  Verbalized understanding of discharge instructions and educational handouts.  All questions answered.  Belongings with patient at time of discharge. Pt was sent home with work note.

## 2019-07-09 NOTE — CARE PLAN
Problem: Venous Thromboembolism (VTW)/Deep Vein Thrombosis (DVT) Prevention:  Goal: Patient will participate in Venous Thrombosis (VTE)/Deep Vein Thrombosis (DVT)Prevention Measures    Intervention: Encourage ambulation/mobilization at level directed by Physical Therapy in collaboration with Interdisciplinary Team  Patient ambulates on their own, patient knows to call before getting up to go to the bathroom or ambulate.      Problem: Pain Management  Goal: Pain level will decrease to patient's comfort goal    Intervention: Follow pain managment plan developed in collaboration with patient and Interdisciplinary Team  Patient is started on gabapentin for pain and numbness in the left arm.

## 2019-07-09 NOTE — CARE PLAN
Problem: Communication  Goal: The ability to communicate needs accurately and effectively will improve  Outcome: PROGRESSING AS EXPECTED  Pt oriented to call light and when to call.  Pt taught pain scale and helped with finding good verbal descriptors for her pain.     Problem: Safety  Goal: Will remain free from falls  Outcome: PROGRESSING AS EXPECTED  Pt oriented to call light and taught to sit at edge of bed before standing to assess for lightheadedness/dizziness.  Bed locked and low and treaded socks in place.

## 2019-07-10 NOTE — DISCHARGE SUMMARY
"Discharge Summary    CHIEF COMPLAINT ON ADMISSION  Chief Complaint   Patient presents with   • Numbness     Left upper arm to fingers, started at 0700 this am. Denies injury. Pt reports \"sharp pain that comes and goes on both sides of my lower jaw\"       Reason for Admission  Arm Pain     Admission Date  7/8/2019    CODE STATUS  Full    HPI & HOSPITAL COURSE  This is a 34 y.o. female with a history of Chiari malformation, followed by neurology here with left arm tingling and numbness.  Due to her history of Chiari mall formation, she was admitted to evaluate for possible CVA.  MRI was negative for any acute findings.  Her chest x-ray did show incomplete visualization of her left thorax which was thought possibly related to nipple shadow.  A CT scan of the chest was recommended therefore this was performed.  It did show multiple small nodules that appeared benign, due to her remote smoking history, she was recommended to have a repeat CT scan done in 12 months to ensure that there is no change.    She does describe having peripheral neuropathy of her hands and feet.  Due to the tingling of her left arm and negative evidence for stroke, she was initiated on gabapentin medication.  She did have some relief with this medication.  She will continue this and follow-up with neurology to see if she needs any upward titration of her dosing.       Therefore, she is discharged in good and stable condition to home with close outpatient follow-up.    The patient recovered much more quickly than anticipated on admission.    Discharge Date  7/9/2019    FOLLOW UP ITEMS POST DISCHARGE  Follow-up with neurology, discuss gabapentin dosing  Follow-up with PCP, get a repeat CT scan of the chest in 12 months to ensure there is no change in the nodules noted on her examination    DISCHARGE DIAGNOSES  Active Problems:    Depression POA: Yes    Left arm numbness POA: Unknown    Chest pain POA: Unknown    Chiari malformation type I (HCC) " POA: Unknown    Hypothyroid POA: Unknown    Hyperglycemia POA: Unknown  Resolved Problems:    * No resolved hospital problems. *      FOLLOW UP  Roseanne Larson  [address] [place] [address]  [phone]      [facility]  left a message with your physician regarding need to establish a follow up appointment. Please call their office directly if you do not hear from them with in 2 days. Thank you      MEDICATIONS ON DISCHARGE     Medication List      START taking these medications      Instructions   gabapentin 100 MG Caps  Commonly known as:  NEURONTIN   Take 1 Cap by mouth 3 times a day.  Dose:  100 mg        CONTINUE taking these medications      Instructions   acetaminophen 500 MG Tabs  Commonly known as:  TYLENOL   Take 1,000 mg by mouth every 6 hours as needed for Moderate Pain.  Dose:  1000 mg     FLUoxetine 20 MG Caps  Commonly known as:  PROZAC   Take 20 mg by mouth every day.  Dose:  20 mg     levothyroxine 150 MCG Tabs  Commonly known as:  SYNTHROID   Take 150 mcg by mouth Every morning on an empty stomach.  Dose:  150 mcg     omeprazole 40 MG delayed-release capsule  Commonly known as:  PRILOSEC   Take 40 mg by mouth every day.  Dose:  40 mg     phentermine 37.5 MG capsule   Take 37.5 mg by mouth every morning.  Dose:  37.5 mg            Allergies  Allergies   Allergen Reactions   • Tramadol Itching   • Vicodin [Hydrocodone-Acetaminophen]      Gets super hyper       DIET  As tolerated    ACTIVITY  As tolerated.  Weight bearing as tolerated    CONSULTATIONS  None    PROCEDURES  None    LABORATORY  Lab Results   Component Value Date    SODIUM 140 07/09/2019    POTASSIUM 3.8 07/09/2019    CHLORIDE 107 07/09/2019    CO2 25 07/09/2019    GLUCOSE 75 07/09/2019    BUN 15 07/09/2019    CREATININE 0.92 07/09/2019        Lab Results   Component Value Date    WBC 9.6 07/08/2019    HEMOGLOBIN 11.3 (L) 07/08/2019    HEMATOCRIT 37.4 07/08/2019    PLATELETCT 388 07/08/2019        Total time of the discharge  process exceeds 34 minutes.

## 2019-10-30 ENCOUNTER — HOSPITAL ENCOUNTER (OUTPATIENT)
Dept: RADIOLOGY | Facility: MEDICAL CENTER | Age: 34
End: 2019-10-30
Attending: NURSE PRACTITIONER
Payer: COMMERCIAL

## 2019-10-30 DIAGNOSIS — R10.9 ABDOMINAL PAIN, UNSPECIFIED ABDOMINAL LOCATION: ICD-10-CM

## 2019-10-30 PROCEDURE — 76700 US EXAM ABDOM COMPLETE: CPT

## 2020-04-14 ENCOUNTER — APPOINTMENT (OUTPATIENT)
Dept: RADIOLOGY | Facility: MEDICAL CENTER | Age: 35
End: 2020-04-14
Attending: NEUROLOGICAL SURGERY
Payer: COMMERCIAL

## 2020-05-19 ENCOUNTER — APPOINTMENT (OUTPATIENT)
Dept: RADIOLOGY | Facility: MEDICAL CENTER | Age: 35
End: 2020-05-19
Attending: NEUROLOGICAL SURGERY
Payer: COMMERCIAL

## 2020-06-19 ENCOUNTER — APPOINTMENT (OUTPATIENT)
Dept: RADIOLOGY | Facility: MEDICAL CENTER | Age: 35
End: 2020-06-19
Attending: EMERGENCY MEDICINE
Payer: COMMERCIAL

## 2020-06-19 ENCOUNTER — HOSPITAL ENCOUNTER (EMERGENCY)
Facility: MEDICAL CENTER | Age: 35
End: 2020-06-19
Attending: EMERGENCY MEDICINE
Payer: COMMERCIAL

## 2020-06-19 VITALS
TEMPERATURE: 98 F | BODY MASS INDEX: 42.19 KG/M2 | DIASTOLIC BLOOD PRESSURE: 71 MMHG | HEIGHT: 62 IN | OXYGEN SATURATION: 98 % | WEIGHT: 229.28 LBS | SYSTOLIC BLOOD PRESSURE: 135 MMHG | HEART RATE: 76 BPM | RESPIRATION RATE: 14 BRPM

## 2020-06-19 DIAGNOSIS — Z3A.08 8 WEEKS GESTATION OF PREGNANCY: ICD-10-CM

## 2020-06-19 DIAGNOSIS — N93.9 VAGINAL BLEEDING: ICD-10-CM

## 2020-06-19 LAB
ALBUMIN SERPL BCP-MCNC: 4.3 G/DL (ref 3.2–4.9)
ALBUMIN/GLOB SERPL: 1.6 G/DL
ALP SERPL-CCNC: 81 U/L (ref 30–99)
ALT SERPL-CCNC: 11 U/L (ref 2–50)
ANION GAP SERPL CALC-SCNC: 13 MMOL/L (ref 7–16)
APPEARANCE UR: ABNORMAL
AST SERPL-CCNC: 15 U/L (ref 12–45)
B-HCG SERPL-ACNC: ABNORMAL MIU/ML (ref 0–5)
BACTERIA #/AREA URNS HPF: ABNORMAL /HPF
BASOPHILS # BLD AUTO: 0.3 % (ref 0–1.8)
BASOPHILS # BLD: 0.03 K/UL (ref 0–0.12)
BILIRUB SERPL-MCNC: 0.4 MG/DL (ref 0.1–1.5)
BILIRUB UR QL STRIP.AUTO: NEGATIVE
BUN SERPL-MCNC: 8 MG/DL (ref 8–22)
CALCIUM SERPL-MCNC: 9.1 MG/DL (ref 8.5–10.5)
CHLORIDE SERPL-SCNC: 103 MMOL/L (ref 96–112)
CO2 SERPL-SCNC: 21 MMOL/L (ref 20–33)
COLOR UR: ABNORMAL
CREAT SERPL-MCNC: 0.51 MG/DL (ref 0.5–1.4)
EOSINOPHIL # BLD AUTO: 0.09 K/UL (ref 0–0.51)
EOSINOPHIL NFR BLD: 0.8 % (ref 0–6.9)
EPI CELLS #/AREA URNS HPF: ABNORMAL /HPF
ERYTHROCYTE [DISTWIDTH] IN BLOOD BY AUTOMATED COUNT: 41.5 FL (ref 35.9–50)
GLOBULIN SER CALC-MCNC: 2.7 G/DL (ref 1.9–3.5)
GLUCOSE SERPL-MCNC: 111 MG/DL (ref 65–99)
GLUCOSE UR STRIP.AUTO-MCNC: NEGATIVE MG/DL
HCT VFR BLD AUTO: 45 % (ref 37–47)
HGB BLD-MCNC: 15 G/DL (ref 12–16)
HYALINE CASTS #/AREA URNS LPF: ABNORMAL /LPF
IMM GRANULOCYTES # BLD AUTO: 0.03 K/UL (ref 0–0.11)
IMM GRANULOCYTES NFR BLD AUTO: 0.3 % (ref 0–0.9)
KETONES UR STRIP.AUTO-MCNC: 40 MG/DL
LEUKOCYTE ESTERASE UR QL STRIP.AUTO: NEGATIVE
LYMPHOCYTES # BLD AUTO: 2.61 K/UL (ref 1–4.8)
LYMPHOCYTES NFR BLD: 22.3 % (ref 22–41)
MCH RBC QN AUTO: 27.2 PG (ref 27–33)
MCHC RBC AUTO-ENTMCNC: 33.3 G/DL (ref 33.6–35)
MCV RBC AUTO: 81.5 FL (ref 81.4–97.8)
MICRO URNS: ABNORMAL
MONOCYTES # BLD AUTO: 0.64 K/UL (ref 0–0.85)
MONOCYTES NFR BLD AUTO: 5.5 % (ref 0–13.4)
NEUTROPHILS # BLD AUTO: 8.3 K/UL (ref 2–7.15)
NEUTROPHILS NFR BLD: 70.8 % (ref 44–72)
NITRITE UR QL STRIP.AUTO: NEGATIVE
NRBC # BLD AUTO: 0 K/UL
NRBC BLD-RTO: 0 /100 WBC
NUMBER OF RH DOSES IND 8505RD: NORMAL
PH UR STRIP.AUTO: 5.5 [PH] (ref 5–8)
PLATELET # BLD AUTO: 332 K/UL (ref 164–446)
PMV BLD AUTO: 9.9 FL (ref 9–12.9)
POTASSIUM SERPL-SCNC: 3.6 MMOL/L (ref 3.6–5.5)
PROT SERPL-MCNC: 7 G/DL (ref 6–8.2)
PROT UR QL STRIP: NEGATIVE MG/DL
RBC # BLD AUTO: 5.52 M/UL (ref 4.2–5.4)
RBC # URNS HPF: ABNORMAL /HPF
RBC UR QL AUTO: ABNORMAL
RH BLD: NORMAL
SODIUM SERPL-SCNC: 137 MMOL/L (ref 135–145)
SP GR UR STRIP.AUTO: 1.03
UROBILINOGEN UR STRIP.AUTO-MCNC: 1 MG/DL
WBC # BLD AUTO: 11.7 K/UL (ref 4.8–10.8)
WBC #/AREA URNS HPF: ABNORMAL /HPF

## 2020-06-19 PROCEDURE — 99284 EMERGENCY DEPT VISIT MOD MDM: CPT

## 2020-06-19 PROCEDURE — 76801 OB US < 14 WKS SINGLE FETUS: CPT

## 2020-06-19 PROCEDURE — 80053 COMPREHEN METABOLIC PANEL: CPT

## 2020-06-19 PROCEDURE — 700102 HCHG RX REV CODE 250 W/ 637 OVERRIDE(OP): Performed by: EMERGENCY MEDICINE

## 2020-06-19 PROCEDURE — A9270 NON-COVERED ITEM OR SERVICE: HCPCS | Performed by: EMERGENCY MEDICINE

## 2020-06-19 PROCEDURE — 86901 BLOOD TYPING SEROLOGIC RH(D): CPT

## 2020-06-19 PROCEDURE — 85025 COMPLETE CBC W/AUTO DIFF WBC: CPT

## 2020-06-19 PROCEDURE — 81001 URINALYSIS AUTO W/SCOPE: CPT

## 2020-06-19 PROCEDURE — 84702 CHORIONIC GONADOTROPIN TEST: CPT

## 2020-06-19 RX ORDER — METOCLOPRAMIDE 10 MG/1
10 TABLET ORAL ONCE
Status: COMPLETED | OUTPATIENT
Start: 2020-06-19 | End: 2020-06-19

## 2020-06-19 RX ORDER — METOCLOPRAMIDE 10 MG/1
10 TABLET ORAL 3 TIMES DAILY PRN
Qty: 20 TAB | Refills: 0 | Status: SHIPPED | OUTPATIENT
Start: 2020-06-19 | End: 2020-07-09

## 2020-06-19 RX ADMIN — METOCLOPRAMIDE 10 MG: 10 TABLET ORAL at 16:06

## 2020-06-19 ASSESSMENT — FIBROSIS 4 INDEX: FIB4 SCORE: 0.4

## 2020-06-19 NOTE — ED TRIAGE NOTES
"Chief Complaint   Patient presents with   • Vaginal Bleeding     Pt is 8 weeks pregnant, , and started bleeding today around 1300 and her OB stated if it got heavier to come to ER. Denies any abd cramping. Pt has saturated 2 pads since 1300.    • Pregnancy     BP (!) 161/96   Pulse 78   Temp 37.1 °C (98.7 °F) (Temporal)   Resp 16   Ht 1.575 m (5' 2\")   Wt 104 kg (229 lb 4.5 oz)   LMP 2020   SpO2 98%   BMI 41.94 kg/m²   Pt placed back in lobby, educated on triage process, and told to inform staff of any change in condition.     "

## 2020-06-19 NOTE — ED PROVIDER NOTES
ED Provider Note    Chief Complaint:   Vaginal bleeding in early pregnancy    HPI:  Cookie Haas is a 35 y.o. female,  at 8 and 4/7 weeks by first trimester ultrasound (estimated date of delivery 2021) who presents with chief complaint of vaginal bleeding in early pregnancy.  Her symptoms began 2 to 3 hours prior to arrival.  She describes painless vaginal bleeding that was initially light.  She contacted her OB clinic, was told to rest and come to the emergency department if her bleeding worsen.  Bleeding became more heavy over the past 1 to 2 hours, prompting her to come to the emergency department.  She denies any associated abdominal pain, she denies pelvic pain.  She has no pelvic cramping.  She most recently saw her obstetrician 3 days ago, which is when she had her ultrasound performed.  She reports no risk factors for heterotopic pregnancy, no history of multiple births, no advanced maternal age, no fertility treatment.  Live intrauterine pregnancy was visualized on ultrasound earlier this week.  She is unable to identify any exacerbating or alleviating factors.  She denies lightheadedness, denies associated shortness of breath.  She denies heavy or brisk bleeding.  Additionally, she denies any abnormal bleeding or bruising.    She is followed by Dr. Jo.    Review of Systems:  See HPI for pertinent positives and negatives. All other systems negative.    Past Medical History:   has a past medical history of Disorder of thyroid, Heart burn, Psychiatric problem, and Snoring.    Social History:  Social History     Tobacco Use   • Smoking status: Former Smoker     Years: 12.00     Last attempt to quit: 3/1/2017     Years since quitting: 3.3   • Smokeless tobacco: Never Used   • Tobacco comment: 3-4 daily   Substance and Sexual Activity   • Alcohol use: No   • Drug use: No   • Sexual activity: Not on file       Surgical History:   has a past surgical history that includes other;  "other; and thyroidectomy total (5/17/2017).    Current Medications:  Home Medications     Reviewed by Sara Morillo R.N. (Registered Nurse) on 06/19/20 at 1527  Med List Status: Partial   Medication Last Dose Status   acetaminophen (TYLENOL) 500 MG Tab prn Active   FLUoxetine (PROZAC) 20 MG Cap taking Active   levothyroxine (SYNTHROID) 150 MCG Tab taking Active   omeprazole (PRILOSEC) 40 MG delayed-release capsule taking Active                Allergies:  Allergies   Allergen Reactions   • Tramadol Itching   • Vicodin [Hydrocodone-Acetaminophen]      Gets super hyper       Physical Exam:  Vital Signs: /71   Pulse 76   Temp 36.7 °C (98 °F) (Oral)   Resp 14   Ht 1.575 m (5' 2\")   Wt 104 kg (229 lb 4.5 oz)   LMP 04/20/2020   SpO2 98%   BMI 41.94 kg/m²   Constitutional: Alert, no acute distress  HENT: Moist mucus membranes, normal posterior pharynx, no intraoral lesions  Eyes: Pupils equal and reactive, normal conjunctiva  Neck: Supple, normal range of motion, no stridor  Cardiovascular: Extremities are warm and well perfused  Pulmonary: No respiratory distress, normal work of breathing  Abdomen: Soft, non-distended, non-tender to palpation, no peritoneal signs, lower abdomen is entirely nontender to palpation  Skin: Warm, dry, no rashes or lesions  Musculoskeletal: Normal range of motion in all extremities, no swelling or deformity noted  Neurologic: Alert, oriented, normal speech, normal motor function  Psychiatric: Normal and appropriate mood and affect    Medical records reviewed for continuity of care.  No recent visits for similar symptoms, no recent medical records available for review.    Labs:  Labs Reviewed   CBC WITH DIFFERENTIAL - Abnormal; Notable for the following components:       Result Value    WBC 11.7 (*)     RBC 5.52 (*)     MCHC 33.3 (*)     Neutrophils (Absolute) 8.30 (*)     All other components within normal limits    Narrative:     Print Consent?->No   COMP METABOLIC PANEL - " Abnormal; Notable for the following components:    Glucose 111 (*)     All other components within normal limits    Narrative:     Print Consent?->No   URINALYSIS,CULTURE IF INDICATED - Abnormal; Notable for the following components:    Character Cloudy (*)     Ketones 40 (*)     Occult Blood Large (*)     All other components within normal limits   URINE MICROSCOPIC (W/UA) - Abnormal; Notable for the following components:    WBC 5-10 (*)     RBC 2-5 (*)     Bacteria Moderate (*)     Epithelial Cells Moderate (*)     Hyaline Cast 6-10 (*)     All other components within normal limits   HCG QUANTITATIVE    Narrative:     Print Consent?->No   RH TYPE FOR RHOGAM FROM E.D.    Narrative:     Print Consent?->No   ESTIMATED GFR    Narrative:     Print Consent?->No       Radiology:  US-OB 1ST TRIMESTER WITH TRANSVAGINAL (COMBO)   Final Result      1.  Viable single intrauterine gestation of an estimated 8 weeks and 6 days with estimated date of delivery of 1/23/2021.      2.  Trace fluid deep to the gestational sac which is probably a small implantation hemorrhage           ED Medications Administered:  Medications   metoclopramide (REGLAN) tablet 10 mg (10 mg Oral Given 6/19/20 1606)       Differential diagnosis:  Heterotopic pregnancy, subchorionic hemorrhage, threatened miscarriage, incomplete miscarriage    MDM:  Patient presents with chief complaint of vaginal bleeding early pregnancy.  On arrival to the emergency department she has no tachycardia, no hypotension, no evidence of acute or brisk blood loss.  As discussed above, she has no risk factors for heterotopic pregnancy.    She does have some nausea that has been ongoing throughout the past few weeks of pregnancy, unchanged today.  She is given a dose of Reglan in the emergency department for the symptoms.    On laboratory evaluation hemoglobin is 15, no evidence of symptomatic anemia.  White blood count is mildly elevated to 11.7.  CMP is reassuring.  She is Rh  positive, no indication for RhoGam, quantitative hCG 171,000, urinalysis is nitrite negative, and leukocyte esterase negative.  Microscopic urinalysis reveals moderate bacteria with moderate epithelial cells, suspect this is likely contamination.  Patient has no symptoms of urinary tract infection.    Ultrasound demonstrates viable single intrauterine gestation of 8 weeks and 6 days.  Trace of fluid deep to the gestational sac, probably a small implantation hemorrhage.    At this time, I do not believe she requires further emergent diagnostics nor imaging.  She is requesting some medication for nausea, states that Diclegis and Zofran have not worked for her in the past.  I gave her dose of Reglan which worked well.  I did give her a prescription for this medication.  She is counseled to call her obstetrician Monday morning to discuss her emergency department visit and results. Return precautions were discussed with the patient, and provided in written form with the patient's discharge instructions.     Blood pressure today is greater than 120/80, patient is instructed to follow up with primary care provider for blood pressure recheck.    This patient was not identified to have risk factors or symptoms concerning for COVID-19.  Personal protective equipment including N95 surgical respirator, goggles, and gloves were used during this encounter.     Disposition:  Discharge home in stable condition    Final Impression:  1. Vaginal bleeding    2. 8 weeks gestation of pregnancy        Electronically signed by: Pallavi Carrillo MD, 6/19/2020 6:29 PM

## 2020-06-20 NOTE — ED NOTES
Patient discharged in stable condition per orders. IV access removed - bandage applied. Wristband removed per protocol. Patient verbalized understanding of all discharge instructions. All belongings accounted for.

## 2020-06-20 NOTE — DISCHARGE INSTRUCTIONS
Please call your obstetrician Monday morning to discuss your emergency department visit and review your records from this visit today.  Return to the emergency department if you develop any new or worsening symptoms, this includes worsening bleeding that soaks more than 2 pads per hour, pelvic pain, lightheadedness, shortness of breath, or if you have any further concerns.

## 2021-12-02 ENCOUNTER — OFFICE VISIT (OUTPATIENT)
Dept: URGENT CARE | Facility: PHYSICIAN GROUP | Age: 36
End: 2021-12-02
Payer: COMMERCIAL

## 2021-12-02 ENCOUNTER — HOSPITAL ENCOUNTER (OUTPATIENT)
Facility: MEDICAL CENTER | Age: 36
End: 2021-12-02
Attending: NURSE PRACTITIONER
Payer: COMMERCIAL

## 2021-12-02 VITALS
HEART RATE: 106 BPM | WEIGHT: 210 LBS | DIASTOLIC BLOOD PRESSURE: 74 MMHG | BODY MASS INDEX: 38.64 KG/M2 | OXYGEN SATURATION: 98 % | SYSTOLIC BLOOD PRESSURE: 112 MMHG | RESPIRATION RATE: 16 BRPM | TEMPERATURE: 98.1 F | HEIGHT: 62 IN

## 2021-12-02 DIAGNOSIS — J02.9 PHARYNGITIS, UNSPECIFIED ETIOLOGY: ICD-10-CM

## 2021-12-02 DIAGNOSIS — J02.0 STREP THROAT: ICD-10-CM

## 2021-12-02 LAB
EXTERNAL QUALITY CONTROL: NORMAL
INT CON NEG: NORMAL
INT CON POS: NORMAL
S PYO AG THROAT QL: POSITIVE
SARS-COV+SARS-COV-2 AG RESP QL IA.RAPID: NEGATIVE

## 2021-12-02 PROCEDURE — 99214 OFFICE O/P EST MOD 30 MIN: CPT | Performed by: NURSE PRACTITIONER

## 2021-12-02 PROCEDURE — 87880 STREP A ASSAY W/OPTIC: CPT | Performed by: NURSE PRACTITIONER

## 2021-12-02 PROCEDURE — 87426 SARSCOV CORONAVIRUS AG IA: CPT | Performed by: NURSE PRACTITIONER

## 2021-12-02 PROCEDURE — U0005 INFEC AGEN DETEC AMPLI PROBE: HCPCS

## 2021-12-02 PROCEDURE — U0003 INFECTIOUS AGENT DETECTION BY NUCLEIC ACID (DNA OR RNA); SEVERE ACUTE RESPIRATORY SYNDROME CORONAVIRUS 2 (SARS-COV-2) (CORONAVIRUS DISEASE [COVID-19]), AMPLIFIED PROBE TECHNIQUE, MAKING USE OF HIGH THROUGHPUT TECHNOLOGIES AS DESCRIBED BY CMS-2020-01-R: HCPCS

## 2021-12-02 RX ORDER — AMOXICILLIN 500 MG/1
500 CAPSULE ORAL 2 TIMES DAILY
Qty: 20 CAPSULE | Refills: 0 | Status: SHIPPED | OUTPATIENT
Start: 2021-12-02 | End: 2021-12-12

## 2021-12-02 ASSESSMENT — ENCOUNTER SYMPTOMS
RESPIRATORY NEGATIVE: 1
FEVER: 1
ABDOMINAL PAIN: 0
VOMITING: 0
CHILLS: 1
DIARRHEA: 1
SORE THROAT: 1
SHORTNESS OF BREATH: 0
MYALGIAS: 1
NAUSEA: 0
COUGH: 0

## 2021-12-02 ASSESSMENT — VISUAL ACUITY: OU: 1

## 2021-12-02 ASSESSMENT — FIBROSIS 4 INDEX: FIB4 SCORE: 0.46

## 2021-12-02 NOTE — PROGRESS NOTES
Subjective:     Cookie Haas is a 36 y.o. female who presents for Pharyngitis (began tuesday night at 11:30. Unable to eat/drink), Fever, Chills, Rash (located inner thighs), and Diarrhea       Pharyngitis   This is a new problem. The problem has been gradually worsening. Associated symptoms include diarrhea. Pertinent negatives include no abdominal pain, coughing, ear pain, shortness of breath or vomiting. She has tried acetaminophen for the symptoms. The treatment provided no relief.   Fever   Associated symptoms include diarrhea, a rash and a sore throat. Pertinent negatives include no abdominal pain, coughing, ear pain, nausea or vomiting.   Chills  Associated symptoms include chills, a fever, myalgias, a rash and a sore throat. Pertinent negatives include no abdominal pain, coughing, nausea or vomiting.   Rash  Associated symptoms include diarrhea, a fever and a sore throat. Pertinent negatives include no cough, shortness of breath or vomiting.   Diarrhea   Associated symptoms include chills, a fever and myalgias. Pertinent negatives include no abdominal pain, coughing or vomiting.     2 days ago, patient started to develop symptoms.  Had some body aches which improved.  Still having severe sore throat and diarrhea.    Patient was screened prior to rooming and denied COVID-19 diagnosis or contact with a person who has been diagnosed or is suspected to have COVID-19. During this visit, appropriate PPE was worn, hand hygiene was performed, and the patient and any visitors were masked.     PMH:  has a past medical history of Disorder of thyroid, Heart burn, Psychiatric problem, and Snoring. She also has no past medical history of Asthma or Type II or unspecified type diabetes mellitus without mention of complication, not stated as uncontrolled.    MEDS:   Current Outpatient Medications:   •  amoxicillin (AMOXIL) 500 MG Cap, Take 1 Capsule by mouth 2 times a day for 10 days., Disp: 20 Capsule, Rfl:  "0  •  acetaminophen (TYLENOL) 500 MG Tab, Take 1,000 mg by mouth every 6 hours as needed for Moderate Pain., Disp: , Rfl:   •  levothyroxine (SYNTHROID) 150 MCG Tab, Take 150 mcg by mouth Every morning on an empty stomach., Disp: , Rfl:   •  FLUoxetine (PROZAC) 20 MG Cap, Take 20 mg by mouth every day. (Patient not taking: Reported on 12/2/2021), Disp: , Rfl: 3    ALLERGIES:   Allergies   Allergen Reactions   • Tramadol Itching   • Vicodin [Hydrocodone-Acetaminophen]      Gets super hyper     SURGHX:   Past Surgical History:   Procedure Laterality Date   • THYROIDECTOMY TOTAL  5/17/2017    Procedure: TOTAL THYROIDECTOMY WITH BILATERAL NIMS RECURRENT LARYNGEAL NERVE MONITORING, DEEP CERVICAL LYMPH NODE BIOPSY ;  Surgeon: Derek Phan M.D.;  Location: SURGERY SAME DAY [facility];  Service:    • OTHER      growth on voicebox   • OTHER      tubes in ears     SOCHX:  reports that she quit smoking about 4 years ago. She quit after 12.00 years of use. She has never used smokeless tobacco. She reports that she does not drink alcohol and does not use drugs.     FH: Reviewed with patient, not pertinent to this visit.    Review of Systems   Constitutional: Positive for chills, fever and malaise/fatigue.   HENT: Positive for sore throat. Negative for ear pain.    Respiratory: Negative.  Negative for cough and shortness of breath.    Gastrointestinal: Positive for diarrhea. Negative for abdominal pain, nausea and vomiting.   Musculoskeletal: Positive for myalgias.   Skin: Positive for rash.   All other systems reviewed and are negative.    Additional details per HPI.      Objective:     /74   Pulse (!) 106   Temp 36.7 °C (98.1 °F) (Temporal)   Resp 16   Ht 1.575 m (5' 2\")   Wt 95.3 kg (210 lb)   LMP 12/01/2021 (Exact Date)   SpO2 98%   Breastfeeding Unknown   BMI 38.41 kg/m²     Physical Exam  Vitals reviewed.   Constitutional:       General: She is not in acute distress.     Appearance: She is " well-developed. She is ill-appearing. She is not toxic-appearing.   HENT:      Head: Normocephalic.      Right Ear: External ear normal.      Left Ear: External ear normal.      Nose: Nose normal.      Mouth/Throat:      Mouth: Mucous membranes are moist.      Pharynx: Pharyngeal swelling, oropharyngeal exudate and posterior oropharyngeal erythema present.   Eyes:      General: Vision grossly intact.      Extraocular Movements: Extraocular movements intact.   Cardiovascular:      Rate and Rhythm: Tachycardia present.   Pulmonary:      Effort: Pulmonary effort is normal. No respiratory distress.   Musculoskeletal:         General: No deformity. Normal range of motion.      Cervical back: Normal range of motion.   Lymphadenopathy:      Cervical: Cervical adenopathy present.   Skin:     General: Skin is warm and dry.      Coloration: Skin is not pale.   Neurological:      Mental Status: She is alert and oriented to person, place, and time.      Sensory: No sensory deficit.      Motor: No weakness.   Psychiatric:         Behavior: Behavior normal. Behavior is cooperative.     POCT Covid: negative    Rapid Strep A swab: positive      Assessment/Plan:     1. Pharyngitis, unspecified etiology  - POCT Rapid Strep A  - POCT SARS-COV Antigen ANTHONY Manual Result  - COVID/SARS CoV-2 PCR; Future    2. Strep throat  - amoxicillin (AMOXIL) 500 MG Cap; Take 1 Capsule by mouth 2 times a day for 10 days.  Dispense: 20 Capsule; Refill: 0    Rx as above sent electronically.    Advised of contagious nature of strep and to avoid close oral contact. Avoid sharing drinks. Change toothbrush 2 days after starting antibiotic. Perform frequent hand hygiene.     Differential diagnosis, natural history, supportive care, over-the-counter symptom management per 's instructions, close monitoring, and indications for immediate follow-up discussed.     All questions answered. Patient agrees with the plan of care.    Work note  provided.    Discharge summary provided through [employer].    Billing note: 30 minutes was allotted and spent for patient care and coordination of care (not reported separately) including preparing for the visit, obtaining/reviewing history, performing an exam/evaluation, ordering Rx, developing a plan of care, counseling/educating the patient, developing the discharge summary for [facility], updating the medical record, reconciling outside information, and documentation. Care specific to this encounter was summarized here. Please refer to the chart for additional details on the care provided.

## 2021-12-02 NOTE — LETTER
December 4, 2021         Patient: Cookie Haas   YOB: 1985   Date of Visit: 12/2/2021           To Whom it May Concern:    Cookie Haas was seen in my clinic on 12/2/2021 due to illness. Due to medical necessity, please excuse patient from work 11/30/2021 through 12/3/2021.    If you have any questions or concerns, please don't hesitate to call.        Sincerely,         BERNADETTE Vale.  Electronically Signed

## 2021-12-03 ENCOUNTER — PATIENT MESSAGE (OUTPATIENT)
Dept: URGENT CARE | Facility: PHYSICIAN GROUP | Age: 36
End: 2021-12-03

## 2021-12-03 DIAGNOSIS — J02.9 PHARYNGITIS, UNSPECIFIED ETIOLOGY: ICD-10-CM

## 2021-12-03 LAB — COVID ORDER STATUS COVID19: NORMAL

## 2021-12-04 LAB
SARS-COV-2 RNA RESP QL NAA+PROBE: NOTDETECTED
SPECIMEN SOURCE: NORMAL

## 2021-12-04 NOTE — PATIENT INSTRUCTIONS
Strep Throat, Adult  Strep throat is an infection in the throat that is caused by bacteria. It is common during the cold months of the year. It mostly affects children who are 5-15 years old. However, people of all ages can get it at any time of the year. This infection spreads from person to person (is contagious) through coughing, sneezing, or having close contact.  Your health care provider may use other names to describe the infection. It can be called tonsillitis (if there is swelling of the tonsils), or pharyngitis (if there is swelling at the back of the throat).  What are the causes?  This condition is caused by the Streptococcus pyogenes bacteria.  What increases the risk?  You are more likely to develop this condition if:  · You care for school-age children, or are around school-age children. Children are more likely to get strep throat and may spread it to others.  · You spend time in crowded places where the infection can spread easily.  · You have close contact with someone who has strep throat.  What are the signs or symptoms?  Symptoms of this condition include:  · Fever or chills.  · Redness, swelling, or pain in the tonsils or throat.  · Pain or difficulty when swallowing.  · White or yellow spots on the tonsils or throat.  · Tender glands in the neck and under the jaw.  · Bad smelling breath.  · Red rash all over the body. This is rare.  How is this diagnosed?  This condition is diagnosed by tests that check for the presence and the amount of bacteria that cause strep throat. They are:  · Rapid strep test. Your throat is swabbed and checked for the presence of bacteria. Results are usually ready in minutes.  · Throat culture test. Your throat is swabbed. The sample is placed in a cup that allows infections to grow. Results are usually ready in 1 or 2 days.  How is this treated?  This condition may be treated with:  · Medicines that kill germs (antibiotics).  · Medicines that relieve pain or fever.  These include:  ? Ibuprofen or acetaminophen.  ? Aspirin, only for patients who are over the age of 18.  ? Throat lozenges.  ? Throat sprays.  Follow these instructions at home:  Medicines    · Take over-the-counter and prescription medicines only as told by your health care provider.  · Take your antibiotic medicine as told by your health care provider. Do not stop taking the antibiotic even if you start to feel better.  Eating and drinking    · If you have trouble swallowing, try eating soft foods until your sore throat feels better.  · Drink enough fluid to keep your urine pale yellow.  · To help relieve pain, you may have:  ? Warm fluids, such as soup and tea.  ? Cold fluids, such as frozen desserts or popsicles.  General instructions  · Gargle with a salt-water mixture 3-4 times a day or as needed. To make a salt-water mixture, completely dissolve ½-1 tsp (3-6 g) of salt in 1 cup (237 mL) of warm water.  · Get plenty of rest.  · Stay home from work or school until you have been taking antibiotics for 24 hours.  · Avoid smoking or being around people who smoke.  · Keep all follow-up visits as told by your health care provider. This is important.  How is this prevented?    · Do not share food, drinking cups, or personal items that could cause the infection to spread to other people.  · Wash your hands well with soap and water, and make sure that all people in your house wash their hands well.  · Have family members tested if they have a sore throat or fever. They may need an antibiotic if they have strep throat.  Contact a health care provider if:  · The glands in your neck continue to get bigger.  · You develop a rash, cough, or earache.  · You cough up a thick mucus that is green, yellow-brown, or bloody.  · You have pain or discomfort that does not get better with medicine.  · Your symptoms seem to be getting worse and not better.  · You have a fever.  Get help right away if:  · You have new symptoms, such as  vomiting, severe headache, stiff or painful neck, chest pain, or shortness of breath.  · You have severe throat pain, drooling, or changes in your voice.  · You have swelling of the neck, or the skin on the neck becomes red and tender.  · You have signs of dehydration, such as tiredness (fatigue), dry mouth, and decreased urination.  · You become increasingly sleepy, or you cannot wake up completely.  · Your joints become red or painful.  Summary  · Strep throat is an infection in the throat that is caused by the Streptococcus pyogenes bacteria. This infection is spread from person to person (is contagious) through coughing, sneezing, or having close contact.  · Take your medicines, including antibiotics, as told by your health care provider. Do not stop taking the antibiotic even if you start to feel better.  · To prevent the spread of germs, wash your hands well with soap and water. Have others do the same. Do not share food, drinking cups, or personal items.  · Get help right away if you have new symptoms, such as vomiting, severe headache, stiff or painful neck, chest pain, or shortness of breath.  This information is not intended to replace advice given to you by your health care provider. Make sure you discuss any questions you have with your health care provider.  Document Released: 12/15/2001 Document Revised: 03/06/2020 Document Reviewed: 03/06/2020  [employer] Patient Education © 2020 [employer]      Advised of contagious nature of strep and to avoid close oral contact. Avoid sharing drinks. Change toothbrush 2 days after starting antibiotic. Perform frequent hand hygiene.

## 2022-02-22 ENCOUNTER — OFFICE VISIT (OUTPATIENT)
Dept: URGENT CARE | Facility: PHYSICIAN GROUP | Age: 37
End: 2022-02-22
Payer: COMMERCIAL

## 2022-02-22 VITALS
OXYGEN SATURATION: 100 % | HEIGHT: 62 IN | DIASTOLIC BLOOD PRESSURE: 82 MMHG | BODY MASS INDEX: 41.22 KG/M2 | TEMPERATURE: 98.1 F | RESPIRATION RATE: 16 BRPM | HEART RATE: 71 BPM | WEIGHT: 224 LBS | SYSTOLIC BLOOD PRESSURE: 124 MMHG

## 2022-02-22 DIAGNOSIS — J02.0 STREPTOCOCCUS PHARYNGITIS: ICD-10-CM

## 2022-02-22 PROBLEM — E89.0 POSTOPERATIVE HYPOTHYROIDISM: Status: ACTIVE | Noted: 2022-02-22

## 2022-02-22 LAB
INT CON NEG: NORMAL
INT CON POS: NORMAL
S PYO AG THROAT QL: POSITIVE

## 2022-02-22 PROCEDURE — 99213 OFFICE O/P EST LOW 20 MIN: CPT | Performed by: PHYSICIAN ASSISTANT

## 2022-02-22 PROCEDURE — 87880 STREP A ASSAY W/OPTIC: CPT | Performed by: PHYSICIAN ASSISTANT

## 2022-02-22 RX ORDER — LEVOTHYROXINE SODIUM 0.2 MG/1
TABLET ORAL
COMMUNITY
Start: 2021-10-12 | End: 2022-03-21

## 2022-02-22 RX ORDER — LEVOTHYROXINE SODIUM 200 MCG
200-300 TABLET ORAL
COMMUNITY
Start: 2022-01-27

## 2022-02-22 RX ORDER — AMOXICILLIN 500 MG/1
500 CAPSULE ORAL 2 TIMES DAILY
Qty: 20 CAPSULE | Refills: 0 | Status: SHIPPED | OUTPATIENT
Start: 2022-02-22 | End: 2022-03-04

## 2022-02-22 ASSESSMENT — FIBROSIS 4 INDEX: FIB4 SCORE: 0.46

## 2022-02-22 NOTE — PROGRESS NOTES
"Subjective:   Cookie Haas is a 36 y.o. female who presents for Pharyngitis (X2days)      HPI  Patient is a 36-year-old female who presents to clinic with complaints of sore throat onset yesterday.  History of strep and this feels similar.  Painful swallowing but handling oral secretions.  She has minimal intermittent itchy cough.  Denies any fever, chills, chest pain, shortness of breath.        Medications:    • acetaminophen Tabs  • levothyroxine Tabs  • Synthroid Tabs    Allergies: Sulfa drugs, Tramadol, and Vicodin [hydrocodone-acetaminophen]    Problem List: Cookie Haas does not have any pertinent problems on file.    Surgical History:  Past Surgical History:   Procedure Laterality Date   • THYROIDECTOMY TOTAL  5/17/2017    Procedure: TOTAL THYROIDECTOMY WITH BILATERAL NIMS RECURRENT LARYNGEAL NERVE MONITORING, DEEP CERVICAL LYMPH NODE BIOPSY ;  Surgeon: Derek Phan M.D.;  Location: SURGERY SAME DAY [facility];  Service:    • OTHER      growth on voicebox   • OTHER      tubes in ears       Past Social Hx: Cookie Haas  reports that she quit smoking about 4 years ago. She quit after 12.00 years of use. She has never used smokeless tobacco. She reports that she does not drink alcohol and does not use drugs.     Past Family Hx:  Cookie Haas family history includes Hypertension in her father and mother; Other in her father.     Problem list, medications, and allergies reviewed by myself today in Epic.     Objective:     /82   Pulse 71   Temp 36.7 °C (98.1 °F) (Temporal)   Resp 16   Ht 1.575 m (5' 2\")   Wt 102 kg (224 lb)   SpO2 100%   BMI 40.97 kg/m²     Physical Exam  Vitals reviewed.   Constitutional:       General: She is not in acute distress.     Appearance: Normal appearance. She is not ill-appearing or toxic-appearing.   HENT:      Mouth/Throat:      Pharynx: Uvula midline. Pharyngeal swelling, oropharyngeal exudate and " posterior oropharyngeal erythema present. No uvula swelling.      Tonsils: No tonsillar abscesses. 1+ on the right. 1+ on the left.   Eyes:      Conjunctiva/sclera: Conjunctivae normal.      Pupils: Pupils are equal, round, and reactive to light.   Cardiovascular:      Rate and Rhythm: Normal rate and regular rhythm.      Heart sounds: Normal heart sounds.   Pulmonary:      Effort: Pulmonary effort is normal. No respiratory distress.      Breath sounds: Normal breath sounds. No wheezing, rhonchi or rales.   Musculoskeletal:      Cervical back: Neck supple. No rigidity.   Lymphadenopathy:      Cervical: Cervical adenopathy present.   Skin:     General: Skin is warm and dry.   Neurological:      General: No focal deficit present.      Mental Status: She is alert and oriented to person, place, and time.   Psychiatric:         Mood and Affect: Mood normal.         Behavior: Behavior normal.         Diagnosis and associated orders:     1. Streptococcus pharyngitis  amoxicillin (AMOXIL) 500 MG Cap    POCT Rapid Strep A        Comments/MDM:     POSITIVE Strep A.  Discussed treatment of amoxicillin for 10 days, salt water gargles, soft foods, cool liquids, ibuprofen and Tylenol for any pain or fevers.   Return to the urgent care if symptoms are not improving or any worsening symptoms or concerns.      I personally reviewed prior external notes and test results pertinent to today's visit. . Supportive care, natural history, differential diagnoses, and indications for immediate follow-up discussed. Patient expresses understanding and agrees to plan. Patient denies any other questions or concerns.     Follow-up with the primary care physician for recheck, reevaluation, and consideration of further management.    Please note that this dictation was created using voice recognition software. I have made a reasonable attempt to correct obvious errors, but I expect that there are errors of grammar and possibly content that I did  not discover before finalizing the note.    This note was electronically signed by Raul Milan PA-C

## 2022-03-11 ENCOUNTER — HOSPITAL ENCOUNTER (OUTPATIENT)
Dept: LAB | Facility: MEDICAL CENTER | Age: 37
End: 2022-03-11
Attending: INTERNAL MEDICINE
Payer: COMMERCIAL

## 2022-03-11 LAB
T4 FREE SERPL-MCNC: 1 NG/DL (ref 0.93–1.7)
TSH SERPL DL<=0.005 MIU/L-ACNC: 32.2 UIU/ML (ref 0.38–5.33)

## 2022-03-11 PROCEDURE — 36415 COLL VENOUS BLD VENIPUNCTURE: CPT

## 2022-03-11 PROCEDURE — 84439 ASSAY OF FREE THYROXINE: CPT

## 2022-03-11 PROCEDURE — 84443 ASSAY THYROID STIM HORMONE: CPT

## 2022-03-21 ENCOUNTER — PRE-ADMISSION TESTING (OUTPATIENT)
Dept: ADMISSIONS | Facility: MEDICAL CENTER | Age: 37
End: 2022-03-21
Attending: SPECIALIST
Payer: COMMERCIAL

## 2022-03-21 DIAGNOSIS — Z01.812 PRE-OPERATIVE LABORATORY EXAMINATION: ICD-10-CM

## 2022-03-21 DIAGNOSIS — Z01.810 PRE-OPERATIVE CARDIOVASCULAR EXAMINATION: ICD-10-CM

## 2022-03-21 PROCEDURE — 93005 ELECTROCARDIOGRAM TRACING: CPT

## 2022-03-22 ENCOUNTER — PRE-ADMISSION TESTING (OUTPATIENT)
Dept: ADMISSIONS | Facility: MEDICAL CENTER | Age: 37
End: 2022-03-22
Attending: SPECIALIST
Payer: COMMERCIAL

## 2022-03-22 DIAGNOSIS — Z01.812 PRE-OPERATIVE LABORATORY EXAMINATION: ICD-10-CM

## 2022-03-22 LAB
ABO GROUP BLD: NORMAL
ALBUMIN SERPL BCP-MCNC: 4.6 G/DL (ref 3.2–4.9)
ALBUMIN/GLOB SERPL: 1.8 G/DL
ALP SERPL-CCNC: 77 U/L (ref 30–99)
ALT SERPL-CCNC: 17 U/L (ref 2–50)
ANION GAP SERPL CALC-SCNC: 10 MMOL/L (ref 7–16)
APTT PPP: 36.5 SEC (ref 24.7–36)
AST SERPL-CCNC: 17 U/L (ref 12–45)
BASOPHILS # BLD AUTO: 0.2 % (ref 0–1.8)
BASOPHILS # BLD: 0.02 K/UL (ref 0–0.12)
BILIRUB SERPL-MCNC: 0.5 MG/DL (ref 0.1–1.5)
BLD GP AB SCN SERPL QL: NORMAL
BUN SERPL-MCNC: 12 MG/DL (ref 8–22)
CALCIUM SERPL-MCNC: 8.8 MG/DL (ref 8.5–10.5)
CHLORIDE SERPL-SCNC: 104 MMOL/L (ref 96–112)
CO2 SERPL-SCNC: 24 MMOL/L (ref 20–33)
CREAT SERPL-MCNC: 0.69 MG/DL (ref 0.5–1.4)
EKG IMPRESSION: NORMAL
EOSINOPHIL # BLD AUTO: 0.15 K/UL (ref 0–0.51)
EOSINOPHIL NFR BLD: 1.8 % (ref 0–6.9)
ERYTHROCYTE [DISTWIDTH] IN BLOOD BY AUTOMATED COUNT: 41.7 FL (ref 35.9–50)
GFR SERPLBLD CREATININE-BSD FMLA CKD-EPI: 116 ML/MIN/1.73 M 2
GLOBULIN SER CALC-MCNC: 2.5 G/DL (ref 1.9–3.5)
GLUCOSE SERPL-MCNC: 91 MG/DL (ref 65–99)
HCG SERPL QL: NEGATIVE
HCT VFR BLD AUTO: 45.3 % (ref 37–47)
HGB BLD-MCNC: 14.5 G/DL (ref 12–16)
IMM GRANULOCYTES # BLD AUTO: 0.04 K/UL (ref 0–0.11)
IMM GRANULOCYTES NFR BLD AUTO: 0.5 % (ref 0–0.9)
INR PPP: 1.03 (ref 0.87–1.13)
LYMPHOCYTES # BLD AUTO: 1.68 K/UL (ref 1–4.8)
LYMPHOCYTES NFR BLD: 20 % (ref 22–41)
MCH RBC QN AUTO: 26.4 PG (ref 27–33)
MCHC RBC AUTO-ENTMCNC: 32 G/DL (ref 33.6–35)
MCV RBC AUTO: 82.4 FL (ref 81.4–97.8)
MONOCYTES # BLD AUTO: 0.52 K/UL (ref 0–0.85)
MONOCYTES NFR BLD AUTO: 6.2 % (ref 0–13.4)
NEUTROPHILS # BLD AUTO: 5.99 K/UL (ref 2–7.15)
NEUTROPHILS NFR BLD: 71.3 % (ref 44–72)
NRBC # BLD AUTO: 0 K/UL
NRBC BLD-RTO: 0 /100 WBC
PLATELET # BLD AUTO: 308 K/UL (ref 164–446)
PMV BLD AUTO: 10.6 FL (ref 9–12.9)
POTASSIUM SERPL-SCNC: 4 MMOL/L (ref 3.6–5.5)
PROT SERPL-MCNC: 7.1 G/DL (ref 6–8.2)
PROTHROMBIN TIME: 13.2 SEC (ref 12–14.6)
RBC # BLD AUTO: 5.5 M/UL (ref 4.2–5.4)
RH BLD: NORMAL
SARS-COV-2 RNA RESP QL NAA+PROBE: NOTDETECTED
SODIUM SERPL-SCNC: 138 MMOL/L (ref 135–145)
SPECIMEN SOURCE: NORMAL
WBC # BLD AUTO: 8.4 K/UL (ref 4.8–10.8)

## 2022-03-22 PROCEDURE — 85610 PROTHROMBIN TIME: CPT

## 2022-03-22 PROCEDURE — U0003 INFECTIOUS AGENT DETECTION BY NUCLEIC ACID (DNA OR RNA); SEVERE ACUTE RESPIRATORY SYNDROME CORONAVIRUS 2 (SARS-COV-2) (CORONAVIRUS DISEASE [COVID-19]), AMPLIFIED PROBE TECHNIQUE, MAKING USE OF HIGH THROUGHPUT TECHNOLOGIES AS DESCRIBED BY CMS-2020-01-R: HCPCS

## 2022-03-22 PROCEDURE — 86900 BLOOD TYPING SEROLOGIC ABO: CPT

## 2022-03-22 PROCEDURE — 85025 COMPLETE CBC W/AUTO DIFF WBC: CPT

## 2022-03-22 PROCEDURE — 85730 THROMBOPLASTIN TIME PARTIAL: CPT

## 2022-03-22 PROCEDURE — 36415 COLL VENOUS BLD VENIPUNCTURE: CPT

## 2022-03-22 PROCEDURE — 84703 CHORIONIC GONADOTROPIN ASSAY: CPT

## 2022-03-22 PROCEDURE — C9803 HOPD COVID-19 SPEC COLLECT: HCPCS

## 2022-03-22 PROCEDURE — 93010 ELECTROCARDIOGRAM REPORT: CPT | Performed by: INTERNAL MEDICINE

## 2022-03-22 PROCEDURE — 86850 RBC ANTIBODY SCREEN: CPT

## 2022-03-22 PROCEDURE — U0005 INFEC AGEN DETEC AMPLI PROBE: HCPCS

## 2022-03-22 PROCEDURE — 80053 COMPREHEN METABOLIC PANEL: CPT

## 2022-03-22 PROCEDURE — 86901 BLOOD TYPING SEROLOGIC RH(D): CPT

## 2022-03-31 ENCOUNTER — HOSPITAL ENCOUNTER (OUTPATIENT)
Facility: MEDICAL CENTER | Age: 37
End: 2022-03-31
Attending: SPECIALIST | Admitting: SPECIALIST
Payer: COMMERCIAL

## 2022-03-31 ENCOUNTER — ANESTHESIA EVENT (OUTPATIENT)
Dept: SURGERY | Facility: MEDICAL CENTER | Age: 37
End: 2022-03-31
Payer: COMMERCIAL

## 2022-03-31 ENCOUNTER — ANESTHESIA (OUTPATIENT)
Dept: SURGERY | Facility: MEDICAL CENTER | Age: 37
End: 2022-03-31
Payer: COMMERCIAL

## 2022-03-31 VITALS
BODY MASS INDEX: 40.45 KG/M2 | HEART RATE: 81 BPM | TEMPERATURE: 97.9 F | HEIGHT: 62 IN | DIASTOLIC BLOOD PRESSURE: 65 MMHG | SYSTOLIC BLOOD PRESSURE: 136 MMHG | OXYGEN SATURATION: 97 % | WEIGHT: 219.8 LBS | RESPIRATION RATE: 15 BRPM

## 2022-03-31 LAB
ABO + RH BLD: NORMAL
EXTERNAL QUALITY CONTROL: NORMAL
HCG UR QL: NEGATIVE
PATHOLOGY CONSULT NOTE: NORMAL
SARS-COV+SARS-COV-2 AG RESP QL IA.RAPID: NEGATIVE

## 2022-03-31 PROCEDURE — 160027 HCHG SURGERY MINUTES - 1ST 30 MINS LEVEL 2: Performed by: SPECIALIST

## 2022-03-31 PROCEDURE — 700105 HCHG RX REV CODE 258: Performed by: ANESTHESIOLOGY

## 2022-03-31 PROCEDURE — 160038 HCHG SURGERY MINUTES - EA ADDL 1 MIN LEVEL 2: Performed by: SPECIALIST

## 2022-03-31 PROCEDURE — 700101 HCHG RX REV CODE 250: Performed by: ANESTHESIOLOGY

## 2022-03-31 PROCEDURE — 87426 SARSCOV CORONAVIRUS AG IA: CPT | Performed by: SPECIALIST

## 2022-03-31 PROCEDURE — 160046 HCHG PACU - 1ST 60 MINS PHASE II: Performed by: SPECIALIST

## 2022-03-31 PROCEDURE — 160009 HCHG ANES TIME/MIN: Performed by: SPECIALIST

## 2022-03-31 PROCEDURE — 160048 HCHG OR STATISTICAL LEVEL 1-5: Performed by: SPECIALIST

## 2022-03-31 PROCEDURE — 88305 TISSUE EXAM BY PATHOLOGIST: CPT | Mod: 59

## 2022-03-31 PROCEDURE — 160035 HCHG PACU - 1ST 60 MINS PHASE I: Performed by: SPECIALIST

## 2022-03-31 PROCEDURE — 700101 HCHG RX REV CODE 250: Performed by: SPECIALIST

## 2022-03-31 PROCEDURE — 36415 COLL VENOUS BLD VENIPUNCTURE: CPT

## 2022-03-31 PROCEDURE — 501838 HCHG SUTURE GENERAL: Performed by: SPECIALIST

## 2022-03-31 PROCEDURE — A9270 NON-COVERED ITEM OR SERVICE: HCPCS | Performed by: ANESTHESIOLOGY

## 2022-03-31 PROCEDURE — 81025 URINE PREGNANCY TEST: CPT

## 2022-03-31 PROCEDURE — 160025 RECOVERY II MINUTES (STATS): Performed by: SPECIALIST

## 2022-03-31 PROCEDURE — 160036 HCHG PACU - EA ADDL 30 MINS PHASE I: Performed by: SPECIALIST

## 2022-03-31 PROCEDURE — 160002 HCHG RECOVERY MINUTES (STAT): Performed by: SPECIALIST

## 2022-03-31 PROCEDURE — 700111 HCHG RX REV CODE 636 W/ 250 OVERRIDE (IP): Performed by: ANESTHESIOLOGY

## 2022-03-31 PROCEDURE — 700102 HCHG RX REV CODE 250 W/ 637 OVERRIDE(OP): Performed by: ANESTHESIOLOGY

## 2022-03-31 PROCEDURE — 700105 HCHG RX REV CODE 258: Performed by: SPECIALIST

## 2022-03-31 RX ORDER — HYDROMORPHONE HYDROCHLORIDE 1 MG/ML
0.4 INJECTION, SOLUTION INTRAMUSCULAR; INTRAVENOUS; SUBCUTANEOUS
Status: DISCONTINUED | OUTPATIENT
Start: 2022-03-31 | End: 2022-03-31 | Stop reason: HOSPADM

## 2022-03-31 RX ORDER — MIDAZOLAM HYDROCHLORIDE 1 MG/ML
1 INJECTION INTRAMUSCULAR; INTRAVENOUS
Status: DISCONTINUED | OUTPATIENT
Start: 2022-03-31 | End: 2022-03-31 | Stop reason: HOSPADM

## 2022-03-31 RX ORDER — HYDRALAZINE HYDROCHLORIDE 20 MG/ML
5 INJECTION INTRAMUSCULAR; INTRAVENOUS
Status: DISCONTINUED | OUTPATIENT
Start: 2022-03-31 | End: 2022-03-31 | Stop reason: HOSPADM

## 2022-03-31 RX ORDER — SODIUM CHLORIDE, SODIUM LACTATE, POTASSIUM CHLORIDE, CALCIUM CHLORIDE 600; 310; 30; 20 MG/100ML; MG/100ML; MG/100ML; MG/100ML
INJECTION, SOLUTION INTRAVENOUS
Status: DISCONTINUED | OUTPATIENT
Start: 2022-03-31 | End: 2022-03-31 | Stop reason: SURG

## 2022-03-31 RX ORDER — OXYCODONE HCL 5 MG/5 ML
10 SOLUTION, ORAL ORAL
Status: COMPLETED | OUTPATIENT
Start: 2022-03-31 | End: 2022-03-31

## 2022-03-31 RX ORDER — ONDANSETRON 2 MG/ML
INJECTION INTRAMUSCULAR; INTRAVENOUS PRN
Status: DISCONTINUED | OUTPATIENT
Start: 2022-03-31 | End: 2022-03-31 | Stop reason: SURG

## 2022-03-31 RX ORDER — CEFAZOLIN SODIUM 1 G/3ML
INJECTION, POWDER, FOR SOLUTION INTRAMUSCULAR; INTRAVENOUS PRN
Status: DISCONTINUED | OUTPATIENT
Start: 2022-03-31 | End: 2022-03-31 | Stop reason: SURG

## 2022-03-31 RX ORDER — HALOPERIDOL 5 MG/ML
1 INJECTION INTRAMUSCULAR
Status: DISCONTINUED | OUTPATIENT
Start: 2022-03-31 | End: 2022-03-31 | Stop reason: HOSPADM

## 2022-03-31 RX ORDER — DEXAMETHASONE SODIUM PHOSPHATE 4 MG/ML
INJECTION, SOLUTION INTRA-ARTICULAR; INTRALESIONAL; INTRAMUSCULAR; INTRAVENOUS; SOFT TISSUE PRN
Status: DISCONTINUED | OUTPATIENT
Start: 2022-03-31 | End: 2022-03-31 | Stop reason: SURG

## 2022-03-31 RX ORDER — MEPERIDINE HYDROCHLORIDE 25 MG/ML
12.5 INJECTION INTRAMUSCULAR; INTRAVENOUS; SUBCUTANEOUS
Status: DISCONTINUED | OUTPATIENT
Start: 2022-03-31 | End: 2022-03-31 | Stop reason: HOSPADM

## 2022-03-31 RX ORDER — SODIUM CHLORIDE, SODIUM LACTATE, POTASSIUM CHLORIDE, CALCIUM CHLORIDE 600; 310; 30; 20 MG/100ML; MG/100ML; MG/100ML; MG/100ML
INJECTION, SOLUTION INTRAVENOUS CONTINUOUS
Status: DISCONTINUED | OUTPATIENT
Start: 2022-03-31 | End: 2022-03-31

## 2022-03-31 RX ORDER — OXYCODONE HCL 5 MG/5 ML
5 SOLUTION, ORAL ORAL
Status: COMPLETED | OUTPATIENT
Start: 2022-03-31 | End: 2022-03-31

## 2022-03-31 RX ORDER — SODIUM CHLORIDE, SODIUM LACTATE, POTASSIUM CHLORIDE, CALCIUM CHLORIDE 600; 310; 30; 20 MG/100ML; MG/100ML; MG/100ML; MG/100ML
INJECTION, SOLUTION INTRAVENOUS CONTINUOUS
Status: DISCONTINUED | OUTPATIENT
Start: 2022-03-31 | End: 2022-03-31 | Stop reason: HOSPADM

## 2022-03-31 RX ORDER — LABETALOL HYDROCHLORIDE 5 MG/ML
5 INJECTION, SOLUTION INTRAVENOUS
Status: DISCONTINUED | OUTPATIENT
Start: 2022-03-31 | End: 2022-03-31 | Stop reason: HOSPADM

## 2022-03-31 RX ORDER — PROPOFOL 10 MG/ML
INJECTION, EMULSION INTRAVENOUS PRN
Status: DISCONTINUED | OUTPATIENT
Start: 2022-03-31 | End: 2022-03-31 | Stop reason: SURG

## 2022-03-31 RX ORDER — DIPHENHYDRAMINE HYDROCHLORIDE 50 MG/ML
12.5 INJECTION INTRAMUSCULAR; INTRAVENOUS
Status: DISCONTINUED | OUTPATIENT
Start: 2022-03-31 | End: 2022-03-31 | Stop reason: HOSPADM

## 2022-03-31 RX ORDER — HYDROMORPHONE HYDROCHLORIDE 1 MG/ML
0.1 INJECTION, SOLUTION INTRAMUSCULAR; INTRAVENOUS; SUBCUTANEOUS
Status: DISCONTINUED | OUTPATIENT
Start: 2022-03-31 | End: 2022-03-31 | Stop reason: HOSPADM

## 2022-03-31 RX ORDER — ONDANSETRON 2 MG/ML
4 INJECTION INTRAMUSCULAR; INTRAVENOUS
Status: DISCONTINUED | OUTPATIENT
Start: 2022-03-31 | End: 2022-03-31 | Stop reason: HOSPADM

## 2022-03-31 RX ORDER — METOPROLOL TARTRATE 1 MG/ML
1 INJECTION, SOLUTION INTRAVENOUS
Status: DISCONTINUED | OUTPATIENT
Start: 2022-03-31 | End: 2022-03-31 | Stop reason: HOSPADM

## 2022-03-31 RX ORDER — BUPIVACAINE HYDROCHLORIDE AND EPINEPHRINE 2.5; 5 MG/ML; UG/ML
INJECTION, SOLUTION EPIDURAL; INFILTRATION; INTRACAUDAL; PERINEURAL
Status: DISCONTINUED | OUTPATIENT
Start: 2022-03-31 | End: 2022-03-31 | Stop reason: HOSPADM

## 2022-03-31 RX ORDER — HYDROMORPHONE HYDROCHLORIDE 1 MG/ML
0.2 INJECTION, SOLUTION INTRAMUSCULAR; INTRAVENOUS; SUBCUTANEOUS
Status: DISCONTINUED | OUTPATIENT
Start: 2022-03-31 | End: 2022-03-31 | Stop reason: HOSPADM

## 2022-03-31 RX ORDER — ROCURONIUM BROMIDE 10 MG/ML
INJECTION, SOLUTION INTRAVENOUS PRN
Status: DISCONTINUED | OUTPATIENT
Start: 2022-03-31 | End: 2022-03-31 | Stop reason: SURG

## 2022-03-31 RX ORDER — LIDOCAINE HYDROCHLORIDE 20 MG/ML
INJECTION, SOLUTION EPIDURAL; INFILTRATION; INTRACAUDAL; PERINEURAL PRN
Status: DISCONTINUED | OUTPATIENT
Start: 2022-03-31 | End: 2022-03-31 | Stop reason: SURG

## 2022-03-31 RX ADMIN — OXYCODONE HYDROCHLORIDE 5 MG: 5 SOLUTION ORAL at 09:22

## 2022-03-31 RX ADMIN — ROCURONIUM BROMIDE 50 MG: 10 INJECTION, SOLUTION INTRAVENOUS at 08:05

## 2022-03-31 RX ADMIN — ONDANSETRON 4 MG: 2 INJECTION INTRAMUSCULAR; INTRAVENOUS at 08:05

## 2022-03-31 RX ADMIN — SODIUM CHLORIDE, POTASSIUM CHLORIDE, SODIUM LACTATE AND CALCIUM CHLORIDE: 600; 310; 30; 20 INJECTION, SOLUTION INTRAVENOUS at 08:00

## 2022-03-31 RX ADMIN — SODIUM CHLORIDE, POTASSIUM CHLORIDE, SODIUM LACTATE AND CALCIUM CHLORIDE: 600; 310; 30; 20 INJECTION, SOLUTION INTRAVENOUS at 06:39

## 2022-03-31 RX ADMIN — PROPOFOL 200 MG: 10 INJECTION, EMULSION INTRAVENOUS at 08:05

## 2022-03-31 RX ADMIN — LIDOCAINE HYDROCHLORIDE 100 MG: 20 INJECTION, SOLUTION EPIDURAL; INFILTRATION; INTRACAUDAL at 08:05

## 2022-03-31 RX ADMIN — SUGAMMADEX 200 MG: 100 INJECTION, SOLUTION INTRAVENOUS at 08:54

## 2022-03-31 RX ADMIN — FENTANYL CITRATE 100 MCG: 50 INJECTION, SOLUTION INTRAMUSCULAR; INTRAVENOUS at 08:05

## 2022-03-31 RX ADMIN — DEXAMETHASONE SODIUM PHOSPHATE 8 MG: 4 INJECTION, SOLUTION INTRA-ARTICULAR; INTRALESIONAL; INTRAMUSCULAR; INTRAVENOUS; SOFT TISSUE at 08:05

## 2022-03-31 RX ADMIN — EPHEDRINE SULFATE 10 MG: 50 INJECTION, SOLUTION INTRAVENOUS at 08:08

## 2022-03-31 RX ADMIN — CEFAZOLIN 2 G: 330 INJECTION, POWDER, FOR SOLUTION INTRAMUSCULAR; INTRAVENOUS at 08:05

## 2022-03-31 ASSESSMENT — PAIN DESCRIPTION - PAIN TYPE
TYPE: ACUTE PAIN
TYPE: SURGICAL PAIN
TYPE: SURGICAL PAIN

## 2022-03-31 ASSESSMENT — PAIN SCALES - GENERAL: PAIN_LEVEL: 1

## 2022-03-31 ASSESSMENT — FIBROSIS 4 INDEX: FIB4 SCORE: 0.46

## 2022-03-31 NOTE — ANESTHESIA PROCEDURE NOTES
Airway    Date/Time: 3/31/2022 8:05 AM  Performed by: Jay Pride D.O.  Authorized by: Jay Pride D.O.     Location:  OR  Urgency:  Elective  Indications for Airway Management:  Anesthesia      Spontaneous Ventilation: absent    Sedation Level:  Deep  Preoxygenated: Yes    Mask Difficulty Assessment:  0 - not attempted  Final Airway Type:  Supraglottic airway  Final Supraglottic Airway:  Standard LMA    SGA Size:  4  Number of Attempts at Approach:  1

## 2022-03-31 NOTE — ANESTHESIA POSTPROCEDURE EVALUATION
Patient: Cookie Haas    Procedure Summary     Date: 03/31/22 Room / Location: [place] [id number] / SURGERY [facility]    Anesthesia Start: 0800 Anesthesia Stop: 0900    Procedure: WIDE EXCISION, LESION - VULVAR (Bilateral ) Diagnosis: (VULVAR DYSPLASIA)    Surgeons: Reg Ness M.D. Responsible Provider: Jay Pride D.O.    Anesthesia Type: general ASA Status: 3          Final Anesthesia Type: general  Last vitals  BP   Blood Pressure: 136/65    Temp   36.6 °C (97.9 °F)    Pulse   81   Resp   15    SpO2   97 %      Anesthesia Post Evaluation    Patient location during evaluation: PACU  Patient participation: complete - patient participated  Level of consciousness: awake and alert  Pain score: 1    Airway patency: patent  Anesthetic complications: no  Cardiovascular status: hemodynamically stable  Respiratory status: acceptable  Hydration status: euvolemic    PONV: none          No complications documented.     Nurse Pain Score: 1 (NPRS)

## 2022-03-31 NOTE — ANESTHESIA TIME REPORT
Anesthesia Start and Stop Event Times     Date Time Event    3/31/2022 0716 Ready for Procedure     0800 Anesthesia Start     0900 Anesthesia Stop        Responsible Staff  03/31/22    Name Role Begin End    Jay Pride D.O. Anesth 0800 0900        Preop Diagnosis (Free Text):  Pre-op Diagnosis     VULVAR DYSPLASIA        Preop Diagnosis (Codes):    Premium Reason  Non-Premium    Comments:

## 2022-03-31 NOTE — DISCHARGE INSTRUCTIONS
ACTIVITY: Rest and take it easy for the first 24 hours.  A responsible adult is recommended to remain with you during that time.  It is normal to feel sleepy.  We encourage you to not do anything that requires balance, judgment or coordination.    MILD FLU-LIKE SYMPTOMS ARE NORMAL. YOU MAY EXPERIENCE GENERALIZED MUSCLE ACHES, THROAT IRRITATION, HEADACHE AND/OR SOME NAUSEA.    FOR 24 HOURS DO NOT:  Drive, operate machinery or run household appliances.  Drink beer or alcoholic beverages.   Make important decisions or sign legal documents.    SPECIAL INSTRUCTIONS: Pelvic rest * 6 weeks No lifting for 6 weeks No driving for 2 weeks F/U  As scheduled. No driving for 2 weeks, no intercourse, nothing in vagina, no tampon , no douching. May shower in the morning. May go up the stairs in the morning. May remove dressing and place bandaid and exchange the bandaid daily for one week. Please call if the wound is red, tender and or warm. Call for fever,nausea, vomiting, or vaginal bleeding.      DIET: To avoid nausea, slowly advance diet as tolerated, avoiding spicy or greasy foods for the first day.  Add more substantial food to your diet according to your physician's instructions.  Babies can be fed formula or breast milk as soon as they are hungry.  INCREASE FLUIDS AND FIBER TO AVOID CONSTIPATION.    SURGICAL DRESSING/BATHING: May shower in the morning, no submerging in water (no hot tubs, pools, bathtubs) until your doctor says it is okay to do so.     FOLLOW-UP APPOINTMENT:  A follow-up appointment should be arranged with Dr. Ness; call [phone] to schedule.    You should CALL YOUR PHYSICIAN if you develop:  Fever greater than 101 degrees F.  Pain not relieved by medication, or persistent nausea or vomiting.  Excessive bleeding (blood soaking through dressing) or unexpected drainage from the wound.  Extreme redness or swelling around the incision site, drainage of pus or foul smelling drainage.  Inability to urinate or  empty your bladder within 8 hours.  Problems with breathing or chest pain.    You should call 911 if you develop problems with breathing or chest pain.  If you are unable to contact your doctor or surgical center, you should go to the nearest emergency room or urgent care center.  Physician's telephone #: [phone]    If any questions arise, call your doctor.  If your doctor is not available, please feel free to call the Surgical Center at [phone].     A registered nurse may call you a few days after your surgery to see how you are doing after your procedure.    MEDICATIONS: Resume taking daily medication.  Take prescribed pain medication with food.  If no medication is prescribed, you may take non-aspirin pain medication if needed.  PAIN MEDICATION CAN BE VERY CONSTIPATING.  Take a stool softener or laxative such as senokot, pericolace, or milk of magnesia if needed.    Prescription given for Percocet.  Last pain medication given at 0922.    If your physician has prescribed pain medication that includes Acetaminophen (Tylenol), do not take additional Acetaminophen (Tylenol) while taking the prescribed medication.    Depression / Suicide Risk    As you are discharged from this [facility] facility, it is important to learn how to keep safe from harming yourself.    Recognize the warning signs:  · Abrupt changes in personality, positive or negative- including increase in energy   · Giving away possessions  · Change in eating patterns- significant weight changes-  positive or negative  · Change in sleeping patterns- unable to sleep or sleeping all the time   · Unwillingness or inability to communicate  · Depression  · Unusual sadness, discouragement and loneliness  · Talk of wanting to die  · Neglect of personal appearance   · Rebelliousness- reckless behavior  · Withdrawal from people/activities they love  · Confusion- inability to concentrate     If you or a loved one observes any of these behaviors or  has concerns about self-harm, here's what you can do:  · Talk about it- your feelings and reasons for harming yourself  · Remove any means that you might use to hurt yourself (examples: pills, rope, extension cords, firearm)  · Get professional help from the community (Mental Health, Substance Abuse, psychological counseling)  · Do not be alone:Call your Safe Contact- someone whom you trust who will be there for you.  · Call your local CRISIS HOTLINE 259-4932 or 150-154-6202  · Call your local Children's Mobile Crisis Response Team [place] (652) 193-3058 or www.[employer]  · Call the toll free National Suicide Prevention Hotlines   · National Suicide Prevention Lifeline 868-825-LAAM (7169)  · National Hope Line Network 800-SUICIDE (334-7932)

## 2022-03-31 NOTE — OP REPORT
PreOp Diagnosis: REMBERTO II-III right and left perineal body      PostOp Diagnosis: same      Procedure(s):  WIDE EXCISION, LESION - VULVAR - Wound Class: Clean Contaminated    Surgeon(s):  Reg Ness M.D.    Anesthesiologist/Type of Anesthesia:  Anesthesiologist: Jay Pride D.O./General    Surgical Staff:  Circulator: Radha Prasad R.N.  Scrub Person: Alia Mcduffie    Specimens removed if any:  ID Type Source Tests Collected by Time Destination   A : A. LEFT LATERAL PERINEAL BODY Tissue Vaginal PATHOLOGY SPECIMEN Reg Ness M.D. 3/31/2022  8:35 AM    B : B. RIGHT LATERAL PERINEAL BODY Tissue Vaginal PATHOLOGY SPECIMEN Reg Ness M.D. 3/31/2022  8:35 AM        Estimated Blood Loss: 10 cc    Findings: dysplastic lesion approximately 1 x 1 cm lateral to the perineal body and inferior and lateral to the labia majora bilaterally.    Complications: None    Indication:Ms. Haas is a pleasant 36-year-old female who was referred to me because of a biopsy-proven REMBERTO 2 lesion on the vulvar.  She is noted to have 2 lesions in the perineal body region inferior and lateral to the labia majora bilaterally.  They appear to be clinically consistent with a vulvar dysplastic lesion.  Patient was advised to undergo wide local excision.  Risk benefits and rational procedures were reviewed with patient in detail patient's understanding these risks and wished to proceed with surgery as planned.    Procedure: After achieving adequate anesthesia patient was prepped and draped in place modified dorsolithotomy position.  A blue marking pen was then used to demarcate the lesion to be excised.  A 1 cm circumferential radial margin was outlined with a blue marking pen.  The left leg was then used to excise the lesion down to the level of the subcutaneous tissue.  Each lesion was excised in similar fashion.  The base of the excision site was cauterized for hemostasis.  Subcutaneous tissue was then reapproximated 2 Vicryl suture the  skin was reapproximated with 2-0 Monocryl sutures.  At the completion of this the area of the incision site was then infiltrated with quarter percent Marcaine for local anesthetic effect.  Patient tolerated procedure well without any difficulties was extubated transferred to PACU in stable condition.

## 2022-03-31 NOTE — OR NURSING
0900: Pt arrives to PACU asleep and calm. VSS. Abd pads and mesh panties in place. No drainage noted.     0955: Pt denies pain or nausea. Dressings CDI. Report called to Dolores DARDEN.

## 2022-03-31 NOTE — OR SURGEON
Immediate Post OP Note    PreOp Diagnosis: REMBERTO II-III right and left perineal body      PostOp Diagnosis: same      Procedure(s):  WIDE EXCISION, LESION - VULVAR - Wound Class: Clean Contaminated    Surgeon(s):  Reg Ness M.D.    Anesthesiologist/Type of Anesthesia:  Anesthesiologist: Jay Pride D.O./General    Surgical Staff:  Circulator: Radha Prasad R.N.  Scrub Person: Alia Mcduffie    Specimens removed if any:  ID Type Source Tests Collected by Time Destination   A : A. LEFT LATERAL PERINEAL BODY Tissue Vaginal PATHOLOGY SPECIMEN Reg Ness M.D. 3/31/2022  8:35 AM    B : B. RIGHT LATERAL PERINEAL BODY Tissue Vaginal PATHOLOGY SPECIMEN Reg Ness M.D. 3/31/2022  8:35 AM        Estimated Blood Loss: 10 cc    Findings: dysplastic lesion approximately 1 x 1 cm lateral to the perineal body and inferior and lateral to the labia majora bilaterally.    Complications: None        3/31/2022 8:54 AM Reg Ness M.D.

## 2022-03-31 NOTE — ANESTHESIA PREPROCEDURE EVALUATION
Case: [id number] Date/Time: 03/31/22 0715    Procedure: WIDE EXCISION, LESION - VULVAR (Bilateral )    Pre-op diagnosis: VULVAR DYSPLASIA    Location: [facility] OR 17 / SURGERY [facility]    Surgeons: Reg Ness M.D.          Relevant Problems   CARDIAC   (positive) HTN (hypertension)      ENDO   (positive) Hypothyroid   (positive) Postoperative hypothyroidism      Other   (positive) Tobacco dependence       Physical Exam    Airway   Mallampati: II  TM distance: >3 FB  Neck ROM: full       Cardiovascular - normal exam  Rhythm: regular  Rate: normal  (-) murmur     Dental - normal exam           Pulmonary - normal exam  Breath sounds clear to auscultation     Abdominal   (+) obese     Neurological - normal exam                 Anesthesia Plan    ASA 3   ASA physical status 3 criteria: morbid obesity - BMI greater than or equal to 40    Plan - general       Airway plan will be LMA          Induction: intravenous    Postoperative Plan: Postoperative administration of opioids is intended.    Pertinent diagnostic labs and testing reviewed    Informed Consent:    Anesthetic plan and risks discussed with patient.    Use of blood products discussed with: patient whom consented to blood products.

## 2022-06-13 ENCOUNTER — HOSPITAL ENCOUNTER (OUTPATIENT)
Dept: LAB | Facility: MEDICAL CENTER | Age: 37
End: 2022-06-13
Attending: INTERNAL MEDICINE
Payer: COMMERCIAL

## 2022-06-13 LAB
T4 FREE SERPL-MCNC: 1.68 NG/DL (ref 0.93–1.7)
TSH SERPL DL<=0.005 MIU/L-ACNC: 1.6 UIU/ML (ref 0.38–5.33)

## 2022-06-13 PROCEDURE — 36415 COLL VENOUS BLD VENIPUNCTURE: CPT

## 2022-06-13 PROCEDURE — 84439 ASSAY OF FREE THYROXINE: CPT

## 2022-06-13 PROCEDURE — 84443 ASSAY THYROID STIM HORMONE: CPT

## 2022-08-22 ENCOUNTER — APPOINTMENT (RX ONLY)
Dept: URBAN - METROPOLITAN AREA CLINIC 22 | Facility: CLINIC | Age: 37
Setting detail: DERMATOLOGY
End: 2022-08-22

## 2022-08-22 DIAGNOSIS — L72.8 OTHER FOLLICULAR CYSTS OF THE SKIN AND SUBCUTANEOUS TISSUE: ICD-10-CM

## 2022-08-22 DIAGNOSIS — Z71.89 OTHER SPECIFIED COUNSELING: ICD-10-CM

## 2022-08-22 DIAGNOSIS — L70.0 ACNE VULGARIS: ICD-10-CM

## 2022-08-22 DIAGNOSIS — L82.1 OTHER SEBORRHEIC KERATOSIS: ICD-10-CM

## 2022-08-22 DIAGNOSIS — D18.0 HEMANGIOMA: ICD-10-CM

## 2022-08-22 DIAGNOSIS — D22 MELANOCYTIC NEVI: ICD-10-CM

## 2022-08-22 DIAGNOSIS — L81.4 OTHER MELANIN HYPERPIGMENTATION: ICD-10-CM

## 2022-08-22 PROBLEM — D18.01 HEMANGIOMA OF SKIN AND SUBCUTANEOUS TISSUE: Status: ACTIVE | Noted: 2022-08-22

## 2022-08-22 PROBLEM — D22.5 MELANOCYTIC NEVI OF TRUNK: Status: ACTIVE | Noted: 2022-08-22

## 2022-08-22 PROCEDURE — ? COUNSELING

## 2022-08-22 PROCEDURE — ? SUNSCREEN RECOMMENDATIONS

## 2022-08-22 PROCEDURE — ? PRESCRIPTION

## 2022-08-22 PROCEDURE — ? ADDITIONAL NOTES

## 2022-08-22 PROCEDURE — 99203 OFFICE O/P NEW LOW 30 MIN: CPT

## 2022-08-22 RX ORDER — DOXYCYCLINE HYCLATE 100 MG/1
CAPSULE, GELATIN COATED ORAL BID
Qty: 14 | Refills: 0 | Status: ERX | COMMUNITY
Start: 2022-08-22

## 2022-08-22 RX ORDER — TRETIONIN 0.25 MG/G
THIN LAYER CREAM TOPICAL QHS
Qty: 45 | Refills: 1 | Status: ERX | COMMUNITY
Start: 2022-08-22

## 2022-08-22 RX ADMIN — TRETIONIN THIN LAYER: 0.25 CREAM TOPICAL at 00:00

## 2022-08-22 RX ADMIN — DOXYCYCLINE HYCLATE: 100 CAPSULE, GELATIN COATED ORAL at 00:00

## 2022-08-22 ASSESSMENT — LOCATION SIMPLE DESCRIPTION DERM
LOCATION SIMPLE: LEFT UPPER BACK
LOCATION SIMPLE: RIGHT UPPER BACK
LOCATION SIMPLE: ABDOMEN
LOCATION SIMPLE: GROIN
LOCATION SIMPLE: INFERIOR FOREHEAD
LOCATION SIMPLE: LEFT FOREARM

## 2022-08-22 ASSESSMENT — LOCATION DETAILED DESCRIPTION DERM
LOCATION DETAILED: LEFT PROXIMAL DORSAL FOREARM
LOCATION DETAILED: INFERIOR MID FOREHEAD
LOCATION DETAILED: RIGHT INGUINAL CREASE
LOCATION DETAILED: LEFT LATERAL ABDOMEN
LOCATION DETAILED: LEFT INFERIOR UPPER BACK
LOCATION DETAILED: RIGHT MID-UPPER BACK

## 2022-08-22 ASSESSMENT — LOCATION ZONE DERM
LOCATION ZONE: TRUNK
LOCATION ZONE: ARM
LOCATION ZONE: FACE

## 2022-08-22 NOTE — PROCEDURE: COUNSELING
Detail Level: Generalized
Detail Level: Zone
Detail Level: Detailed
Dapsone Pregnancy And Lactation Text: This medication is Pregnancy Category C and is not considered safe during pregnancy or breast feeding.
Azelaic Acid Pregnancy And Lactation Text: This medication is considered safe during pregnancy and breast feeding.
Topical Clindamycin Counseling: Patient counseled that this medication may cause skin irritation or allergic reactions.  In the event of skin irritation, the patient was advised to reduce the amount of the drug applied or use it less frequently.   The patient verbalized understanding of the proper use and possible adverse effects of clindamycin.  All of the patient's questions and concerns were addressed.
Topical Sulfur Applications Counseling: Topical Sulfur Counseling: Patient counseled that this medication may cause skin irritation or allergic reactions.  In the event of skin irritation, the patient was advised to reduce the amount of the drug applied or use it less frequently.   The patient verbalized understanding of the proper use and possible adverse effects of topical sulfur application.  All of the patient's questions and concerns were addressed.
Azithromycin Counseling:  I discussed with the patient the risks of azithromycin including but not limited to GI upset, allergic reaction, drug rash, diarrhea, and yeast infections.
Benzoyl Peroxide Pregnancy And Lactation Text: This medication is Pregnancy Category C. It is unknown if benzoyl peroxide is excreted in breast milk.
Tetracycline Counseling: Patient counseled regarding possible photosensitivity and increased risk for sunburn.  Patient instructed to avoid sunlight, if possible.  When exposed to sunlight, patients should wear protective clothing, sunglasses, and sunscreen.  The patient was instructed to call the office immediately if the following severe adverse effects occur:  hearing changes, easy bruising/bleeding, severe headache, or vision changes.  The patient verbalized understanding of the proper use and possible adverse effects of tetracycline.  All of the patient's questions and concerns were addressed. Patient understands to avoid pregnancy while on therapy due to potential birth defects.
Include Pregnancy/Lactation Warning?: No
Bactrim Counseling:  I discussed with the patient the risks of sulfa antibiotics including but not limited to GI upset, allergic reaction, drug rash, diarrhea, dizziness, photosensitivity, and yeast infections.  Rarely, more serious reactions can occur including but not limited to aplastic anemia, agranulocytosis, methemoglobinemia, blood dyscrasias, liver or kidney failure, lung infiltrates or desquamative/blistering drug rashes.
Doxycycline Pregnancy And Lactation Text: This medication is Pregnancy Category D and not consider safe during pregnancy. It is also excreted in breast milk but is considered safe for shorter treatment courses.
Erythromycin Counseling:  I discussed with the patient the risks of erythromycin including but not limited to GI upset, allergic reaction, drug rash, diarrhea, increase in liver enzymes, and yeast infections.
Minocycline Pregnancy And Lactation Text: This medication is Pregnancy Category D and not consider safe during pregnancy. It is also excreted in breast milk.
Aklief counseling:  Patient advised to apply a pea-sized amount only at bedtime and wait 30 minutes after washing their face before applying.  If too drying, patient may add a non-comedogenic moisturizer.  The most commonly reported side effects including irritation, redness, scaling, dryness, stinging, burning, itching, and increased risk of sunburn.  The patient verbalized understanding of the proper use and possible adverse effects of retinoids.  All of the patient's questions and concerns were addressed.
Topical Retinoid Pregnancy And Lactation Text: This medication is Pregnancy Category C. It is unknown if this medication is excreted in breast milk.
High Dose Vitamin A Pregnancy And Lactation Text: High dose vitamin A therapy is contraindicated during pregnancy and breast feeding.
Erythromycin Pregnancy And Lactation Text: This medication is Pregnancy Category B and is considered safe during pregnancy. It is also excreted in breast milk.
Sarecycline Counseling: Patient advised regarding possible photosensitivity and discoloration of the teeth, skin, lips, tongue and gums.  Patient instructed to avoid sunlight, if possible.  When exposed to sunlight, patients should wear protective clothing, sunglasses, and sunscreen.  The patient was instructed to call the office immediately if the following severe adverse effects occur:  hearing changes, easy bruising/bleeding, severe headache, or vision changes.  The patient verbalized understanding of the proper use and possible adverse effects of sarecycline.  All of the patient's questions and concerns were addressed.
Aklief Pregnancy And Lactation Text: It is unknown if this medication is safe to use during pregnancy.  It is unknown if this medication is excreted in breast milk.  Breastfeeding women should use the topical cream on the smallest area of the skin for the shortest time needed while breastfeeding.  Do not apply to nipple and areola.
Tazorac Counseling:  Patient advised that medication is irritating and drying.  Patient may need to apply sparingly and wash off after an hour before eventually leaving it on overnight.  The patient verbalized understanding of the proper use and possible adverse effects of tazorac.  All of the patient's questions and concerns were addressed.
Winlevi Pregnancy And Lactation Text: This medication is considered safe during pregnancy and breastfeeding.
Isotretinoin Pregnancy And Lactation Text: This medication is Pregnancy Category X and is considered extremely dangerous during pregnancy. It is unknown if it is excreted in breast milk.
Spironolactone Counseling: Patient advised regarding risks of diarrhea, abdominal pain, hyperkalemia, birth defects (for female patients), liver toxicity and renal toxicity. The patient may need blood work to monitor liver and kidney function and potassium levels while on therapy. The patient verbalized understanding of the proper use and possible adverse effects of spironolactone.  All of the patient's questions and concerns were addressed.
Birth Control Pills Pregnancy And Lactation Text: This medication should be avoided if pregnant and for the first 30 days post-partum.
High Dose Vitamin A Counseling: Side effects reviewed, pt to contact office should one occur.
Benzoyl Peroxide Counseling: Patient counseled that medicine may cause skin irritation and bleach clothing.  In the event of skin irritation, the patient was advised to reduce the amount of the drug applied or use it less frequently.   The patient verbalized understanding of the proper use and possible adverse effects of benzoyl peroxide.  All of the patient's questions and concerns were addressed.
Topical Clindamycin Pregnancy And Lactation Text: This medication is Pregnancy Category B and is considered safe during pregnancy. It is unknown if it is excreted in breast milk.
Spironolactone Pregnancy And Lactation Text: This medication can cause feminization of the male fetus and should be avoided during pregnancy. The active metabolite is also found in breast milk.
Topical Sulfur Applications Pregnancy And Lactation Text: This medication is Pregnancy Category C and has an unknown safety profile during pregnancy. It is unknown if this topical medication is excreted in breast milk.
Topical Retinoid counseling:  Patient advised to apply a pea-sized amount only at bedtime and wait 30 minutes after washing their face before applying.  If too drying, patient may add a non-comedogenic moisturizer. The patient verbalized understanding of the proper use and possible adverse effects of retinoids.  All of the patient's questions and concerns were addressed.
Bactrim Pregnancy And Lactation Text: This medication is Pregnancy Category D and is known to cause fetal risk.  It is also excreted in breast milk.
Azithromycin Pregnancy And Lactation Text: This medication is considered safe during pregnancy and is also secreted in breast milk.
Doxycycline Counseling:  Patient counseled regarding possible photosensitivity and increased risk for sunburn.  Patient instructed to avoid sunlight, if possible.  When exposed to sunlight, patients should wear protective clothing, sunglasses, and sunscreen.  The patient was instructed to call the office immediately if the following severe adverse effects occur:  hearing changes, easy bruising/bleeding, severe headache, or vision changes.  The patient verbalized understanding of the proper use and possible adverse effects of doxycycline.  All of the patient's questions and concerns were addressed.
Minocycline Counseling: Patient advised regarding possible photosensitivity and discoloration of the teeth, skin, lips, tongue and gums.  Patient instructed to avoid sunlight, if possible.  When exposed to sunlight, patients should wear protective clothing, sunglasses, and sunscreen.  The patient was instructed to call the office immediately if the following severe adverse effects occur:  hearing changes, easy bruising/bleeding, severe headache, or vision changes.  The patient verbalized understanding of the proper use and possible adverse effects of minocycline.  All of the patient's questions and concerns were addressed.
Winlevi Counseling:  I discussed with the patient the risks of topical clascoterone including but not limited to erythema, scaling, itching, and stinging. Patient voiced their understanding.
Birth Control Pills Counseling: Birth Control Pill Counseling: I discussed with the patient the potential side effects of OCPs including but not limited to increased risk of stroke, heart attack, thrombophlebitis, deep venous thrombosis, hepatic adenomas, breast changes, GI upset, headaches, and depression.  The patient verbalized understanding of the proper use and possible adverse effects of OCPs. All of the patient's questions and concerns were addressed.
Azelaic Acid Counseling: Patient counseled that medicine may cause skin irritation and to avoid applying near the eyes.  In the event of skin irritation, the patient was advised to reduce the amount of the drug applied or use it less frequently.   The patient verbalized understanding of the proper use and possible adverse effects of azelaic acid.  All of the patient's questions and concerns were addressed.
Tazorac Pregnancy And Lactation Text: This medication is not safe during pregnancy. It is unknown if this medication is excreted in breast milk.
Dapsone Counseling: I discussed with the patient the risks of dapsone including but not limited to hemolytic anemia, agranulocytosis, rashes, methemoglobinemia, kidney failure, peripheral neuropathy, headaches, GI upset, and liver toxicity.  Patients who start dapsone require monitoring including baseline LFTs and weekly CBCs for the first month, then every month thereafter.  The patient verbalized understanding of the proper use and possible adverse effects of dapsone.  All of the patient's questions and concerns were addressed.
Isotretinoin Counseling: Patient should get monthly blood tests, not donate blood, not drive at night if vision affected, not share medication, and not undergo elective surgery for 6 months after tx completed. Side effects reviewed, pt to contact office should one occur.
Detail Level: Simple

## 2022-08-22 NOTE — PROCEDURE: ADDITIONAL NOTES
Detail Level: Simple
Additional Notes: Patient will call if not improving, consider ILK \\nNon fluctuant.   Patient gets cysts on and off however this one has been more painful than average.
Render Risk Assessment In Note?: no

## 2022-11-28 ENCOUNTER — HOSPITAL ENCOUNTER (OUTPATIENT)
Dept: LAB | Facility: MEDICAL CENTER | Age: 37
End: 2022-11-28
Attending: INTERNAL MEDICINE
Payer: COMMERCIAL

## 2022-11-28 LAB
25(OH)D3 SERPL-MCNC: 29 NG/ML (ref 30–100)
DHEA-S SERPL-MCNC: 44 UG/DL (ref 60.9–337)
ESTRADIOL SERPL-MCNC: 54.2 PG/ML
FSH SERPL-ACNC: 6.2 MIU/ML
LH SERPL-ACNC: 5.5 IU/L
T3FREE SERPL-MCNC: 5.29 PG/ML (ref 2–4.4)
T4 FREE SERPL-MCNC: 2.08 NG/DL (ref 0.93–1.7)
THYROPEROXIDASE AB SERPL-ACNC: 16 IU/ML (ref 0–9)
VIT B12 SERPL-MCNC: 2912 PG/ML (ref 211–911)

## 2022-11-28 PROCEDURE — 84270 ASSAY OF SEX HORMONE GLOBUL: CPT

## 2022-11-28 PROCEDURE — 84439 ASSAY OF FREE THYROXINE: CPT

## 2022-11-28 PROCEDURE — 82670 ASSAY OF TOTAL ESTRADIOL: CPT

## 2022-11-28 PROCEDURE — 82626 DEHYDROEPIANDROSTERONE: CPT

## 2022-11-28 PROCEDURE — 84402 ASSAY OF FREE TESTOSTERONE: CPT

## 2022-11-28 PROCEDURE — 86376 MICROSOMAL ANTIBODY EACH: CPT

## 2022-11-28 PROCEDURE — 84443 ASSAY THYROID STIM HORMONE: CPT

## 2022-11-28 PROCEDURE — 84481 FREE ASSAY (FT-3): CPT

## 2022-11-28 PROCEDURE — 83002 ASSAY OF GONADOTROPIN (LH): CPT

## 2022-11-28 PROCEDURE — 36415 COLL VENOUS BLD VENIPUNCTURE: CPT

## 2022-11-28 PROCEDURE — 83001 ASSAY OF GONADOTROPIN (FSH): CPT

## 2022-11-28 PROCEDURE — 82157 ASSAY OF ANDROSTENEDIONE: CPT

## 2022-11-28 PROCEDURE — 82306 VITAMIN D 25 HYDROXY: CPT

## 2022-11-28 PROCEDURE — 84403 ASSAY OF TOTAL TESTOSTERONE: CPT

## 2022-11-28 PROCEDURE — 82607 VITAMIN B-12: CPT

## 2022-11-28 PROCEDURE — 82627 DEHYDROEPIANDROSTERONE: CPT

## 2022-11-29 LAB — TSH SERPL DL<=0.005 MIU/L-ACNC: 0.01 UIU/ML (ref 0.38–5.33)

## 2022-12-02 LAB
ANDROST SERPL-MCNC: 0.33 NG/ML (ref 0.26–2.14)
DHEA SERPL-MCNC: 2.8 NG/ML (ref 1.33–7.78)
SHBG SERPL-SCNC: 82 NMOL/L (ref 25–122)
TESTOST FREE SERPL-MCNC: 1.4 PG/ML (ref 1.3–9.2)
TESTOST SERPL-MCNC: 15 NG/DL (ref 9–55)

## 2023-04-07 ENCOUNTER — HOSPITAL ENCOUNTER (OUTPATIENT)
Dept: LAB | Facility: MEDICAL CENTER | Age: 38
End: 2023-04-07
Attending: INTERNAL MEDICINE
Payer: COMMERCIAL

## 2023-04-07 PROCEDURE — 82670 ASSAY OF TOTAL ESTRADIOL: CPT

## 2023-04-07 PROCEDURE — 36415 COLL VENOUS BLD VENIPUNCTURE: CPT

## 2023-04-07 PROCEDURE — 84270 ASSAY OF SEX HORMONE GLOBUL: CPT

## 2023-04-07 PROCEDURE — 82627 DEHYDROEPIANDROSTERONE: CPT

## 2023-04-07 PROCEDURE — 82607 VITAMIN B-12: CPT

## 2023-04-07 PROCEDURE — 84439 ASSAY OF FREE THYROXINE: CPT

## 2023-04-07 PROCEDURE — 82626 DEHYDROEPIANDROSTERONE: CPT

## 2023-04-07 PROCEDURE — 84443 ASSAY THYROID STIM HORMONE: CPT

## 2023-04-07 PROCEDURE — 84481 FREE ASSAY (FT-3): CPT

## 2023-04-07 PROCEDURE — 83001 ASSAY OF GONADOTROPIN (FSH): CPT

## 2023-04-07 PROCEDURE — 86376 MICROSOMAL ANTIBODY EACH: CPT

## 2023-04-07 PROCEDURE — 82157 ASSAY OF ANDROSTENEDIONE: CPT

## 2023-04-07 PROCEDURE — 83002 ASSAY OF GONADOTROPIN (LH): CPT

## 2023-04-07 PROCEDURE — 84403 ASSAY OF TOTAL TESTOSTERONE: CPT

## 2023-04-07 PROCEDURE — 84402 ASSAY OF FREE TESTOSTERONE: CPT

## 2023-04-08 LAB
DHEA-S SERPL-MCNC: 81.5 UG/DL (ref 60.9–337)
ESTRADIOL SERPL-MCNC: 53 PG/ML
FSH SERPL-ACNC: 8.6 MIU/ML
LH SERPL-ACNC: 11.1 IU/L
T3FREE SERPL-MCNC: 2.93 PG/ML (ref 2–4.4)
T4 FREE SERPL-MCNC: 1.47 NG/DL (ref 0.93–1.7)
THYROPEROXIDASE AB SERPL-ACNC: 12 IU/ML (ref 0–9)
TSH SERPL DL<=0.005 MIU/L-ACNC: 0.03 UIU/ML (ref 0.38–5.33)
VIT B12 SERPL-MCNC: 1095 PG/ML (ref 211–911)

## 2023-04-16 LAB
ANDROST SERPL-MCNC: 0.48 NG/ML (ref 0.26–2.14)
DHEA SERPL-MCNC: 2.16 NG/ML (ref 1.33–7.78)
SHBG SERPL-SCNC: 80 NMOL/L (ref 25–122)
TESTOST FREE SERPL-MCNC: 2.3 PG/ML (ref 1.3–9.2)
TESTOST SERPL-MCNC: 25 NG/DL (ref 9–55)

## 2023-08-24 ENCOUNTER — APPOINTMENT (RX ONLY)
Dept: URBAN - METROPOLITAN AREA CLINIC 22 | Facility: CLINIC | Age: 38
Setting detail: DERMATOLOGY
End: 2023-08-24

## 2023-08-24 DIAGNOSIS — D22 MELANOCYTIC NEVI: ICD-10-CM

## 2023-08-24 DIAGNOSIS — L72.8 OTHER FOLLICULAR CYSTS OF THE SKIN AND SUBCUTANEOUS TISSUE: ICD-10-CM

## 2023-08-24 DIAGNOSIS — D18.0 HEMANGIOMA: ICD-10-CM

## 2023-08-24 DIAGNOSIS — L81.4 OTHER MELANIN HYPERPIGMENTATION: ICD-10-CM

## 2023-08-24 DIAGNOSIS — Z71.89 OTHER SPECIFIED COUNSELING: ICD-10-CM

## 2023-08-24 DIAGNOSIS — L82.1 OTHER SEBORRHEIC KERATOSIS: ICD-10-CM

## 2023-08-24 PROBLEM — D22.5 MELANOCYTIC NEVI OF TRUNK: Status: ACTIVE | Noted: 2023-08-24

## 2023-08-24 PROBLEM — D18.01 HEMANGIOMA OF SKIN AND SUBCUTANEOUS TISSUE: Status: ACTIVE | Noted: 2023-08-24

## 2023-08-24 PROCEDURE — 99213 OFFICE O/P EST LOW 20 MIN: CPT

## 2023-08-24 PROCEDURE — ? SUNSCREEN RECOMMENDATIONS

## 2023-08-24 PROCEDURE — ? ADDITIONAL NOTES

## 2023-08-24 PROCEDURE — ? COUNSELING

## 2023-08-24 PROCEDURE — ? PRESCRIPTION SAMPLES PROVIDED

## 2023-08-24 PROCEDURE — ? PHOTO-DOCUMENTATION

## 2023-08-24 ASSESSMENT — LOCATION SIMPLE DESCRIPTION DERM
LOCATION SIMPLE: RIGHT UPPER BACK
LOCATION SIMPLE: ABDOMEN
LOCATION SIMPLE: RIGHT THIGH
LOCATION SIMPLE: LEFT FOREARM
LOCATION SIMPLE: LEFT UPPER BACK

## 2023-08-24 ASSESSMENT — LOCATION DETAILED DESCRIPTION DERM
LOCATION DETAILED: LEFT PROXIMAL DORSAL FOREARM
LOCATION DETAILED: LEFT INFERIOR UPPER BACK
LOCATION DETAILED: LEFT LATERAL ABDOMEN
LOCATION DETAILED: RIGHT ANTERIOR PROXIMAL THIGH
LOCATION DETAILED: RIGHT MID-UPPER BACK

## 2023-08-24 ASSESSMENT — LOCATION ZONE DERM
LOCATION ZONE: LEG
LOCATION ZONE: TRUNK
LOCATION ZONE: ARM

## 2023-08-24 NOTE — PROCEDURE: ADDITIONAL NOTES
Render Risk Assessment In Note?: no
Additional Notes: Currently without inflammation however patient notes it waxes and wanes persistently\\nIt is in inguinal crease and is exacerbated by underwear, etc. \\nShe is interested in possibly getting this excised in the long run but may want to wait until winter. \\nDid give her some Onexton samples and recommend daily application to see if this helps with frequently of flares.
Detail Level: Simple

## 2024-05-08 ENCOUNTER — HOSPITAL ENCOUNTER (OUTPATIENT)
Dept: LAB | Facility: MEDICAL CENTER | Age: 39
End: 2024-05-08
Attending: INTERNAL MEDICINE
Payer: COMMERCIAL

## 2024-05-09 LAB
ESTRADIOL SERPL-MCNC: 295 PG/ML
FSH SERPL-ACNC: 6.7 MIU/ML
LH SERPL-ACNC: 17.4 IU/L
T3FREE SERPL-MCNC: 4.64 PG/ML (ref 2–4.4)
T4 FREE SERPL-MCNC: 1.96 NG/DL (ref 0.93–1.7)
TESTOST SERPL-MCNC: 19 NG/DL (ref 9–75)
THYROPEROXIDASE AB SERPL-ACNC: 16 IU/ML (ref 0–9)
TSH SERPL DL<=0.005 MIU/L-ACNC: <0.005 UIU/ML (ref 0.38–5.33)
VIT B12 SERPL-MCNC: 860 PG/ML (ref 211–911)

## 2024-05-10 LAB — SHBG SERPL-SCNC: 89 NMOL/L (ref 25–122)

## 2024-05-12 LAB — ANDROST SERPL-MCNC: 0.58 NG/ML (ref 0.26–2.14)

## 2024-11-22 ENCOUNTER — NON-PROVIDER VISIT (OUTPATIENT)
Dept: URGENT CARE | Facility: PHYSICIAN GROUP | Age: 39
End: 2024-11-22
Payer: COMMERCIAL

## 2024-11-22 ENCOUNTER — APPOINTMENT (OUTPATIENT)
Dept: RADIOLOGY | Facility: IMAGING CENTER | Age: 39
End: 2024-11-22
Attending: CHIROPRACTOR
Payer: COMMERCIAL

## 2024-11-22 DIAGNOSIS — M99.02 THORACIC SEGMENT DYSFUNCTION: ICD-10-CM

## 2024-11-22 DIAGNOSIS — M99.04 SOMATIC DYSFUNCTION OF SACRAL REGION: ICD-10-CM

## 2024-11-22 DIAGNOSIS — M99.03 SOMATIC DYSFUNCTION OF LUMBAR REGION: ICD-10-CM

## 2024-11-22 DIAGNOSIS — M99.01 CERVICAL SEGMENT DYSFUNCTION: ICD-10-CM

## 2024-11-22 PROCEDURE — 72170 X-RAY EXAM OF PELVIS: CPT | Mod: TC,FY | Performed by: RADIOLOGY

## 2024-11-22 PROCEDURE — 72100 X-RAY EXAM L-S SPINE 2/3 VWS: CPT | Mod: TC,FY | Performed by: RADIOLOGY

## 2024-11-22 PROCEDURE — 72072 X-RAY EXAM THORAC SPINE 3VWS: CPT | Mod: TC,FY | Performed by: RADIOLOGY

## 2024-11-22 PROCEDURE — 72040 X-RAY EXAM NECK SPINE 2-3 VW: CPT | Mod: TC,FY | Performed by: RADIOLOGY

## 2025-02-08 ENCOUNTER — HOSPITAL ENCOUNTER (OUTPATIENT)
Dept: LAB | Facility: MEDICAL CENTER | Age: 40
End: 2025-02-08
Attending: INTERNAL MEDICINE
Payer: COMMERCIAL

## 2025-02-08 LAB
ALBUMIN SERPL BCP-MCNC: 4.2 G/DL (ref 3.2–4.9)
ALBUMIN/GLOB SERPL: 1.5 G/DL
ALP SERPL-CCNC: 64 U/L (ref 30–99)
ALT SERPL-CCNC: 18 U/L (ref 2–50)
ANION GAP SERPL CALC-SCNC: 10 MMOL/L (ref 7–16)
AST SERPL-CCNC: 23 U/L (ref 12–45)
BASOPHILS # BLD AUTO: 0.5 % (ref 0–1.8)
BASOPHILS # BLD: 0.04 K/UL (ref 0–0.12)
BILIRUB SERPL-MCNC: 0.5 MG/DL (ref 0.1–1.5)
BUN SERPL-MCNC: 14 MG/DL (ref 8–22)
CALCIUM ALBUM COR SERPL-MCNC: 8.8 MG/DL (ref 8.5–10.5)
CALCIUM SERPL-MCNC: 9 MG/DL (ref 8.5–10.5)
CHLORIDE SERPL-SCNC: 108 MMOL/L (ref 96–112)
CHOLEST SERPL-MCNC: 138 MG/DL (ref 100–199)
CO2 SERPL-SCNC: 21 MMOL/L (ref 20–33)
CREAT SERPL-MCNC: 0.79 MG/DL (ref 0.5–1.4)
EOSINOPHIL # BLD AUTO: 0.15 K/UL (ref 0–0.51)
EOSINOPHIL NFR BLD: 2 % (ref 0–6.9)
ERYTHROCYTE [DISTWIDTH] IN BLOOD BY AUTOMATED COUNT: 40.1 FL (ref 35.9–50)
EST. AVERAGE GLUCOSE BLD GHB EST-MCNC: 88 MG/DL
FASTING STATUS PATIENT QL REPORTED: NORMAL
FERRITIN SERPL-MCNC: 79.1 NG/ML (ref 10–291)
GFR SERPLBLD CREATININE-BSD FMLA CKD-EPI: 98 ML/MIN/1.73 M 2
GLOBULIN SER CALC-MCNC: 2.8 G/DL (ref 1.9–3.5)
GLUCOSE SERPL-MCNC: 72 MG/DL (ref 65–99)
HBA1C MFR BLD: 4.7 % (ref 4–5.6)
HCT VFR BLD AUTO: 45.8 % (ref 37–47)
HDLC SERPL-MCNC: 38 MG/DL
HGB BLD-MCNC: 15 G/DL (ref 12–16)
IMM GRANULOCYTES # BLD AUTO: 0.03 K/UL (ref 0–0.11)
IMM GRANULOCYTES NFR BLD AUTO: 0.4 % (ref 0–0.9)
IRON SATN MFR SERPL: 38 % (ref 15–55)
IRON SERPL-MCNC: 78 UG/DL (ref 40–170)
LDLC SERPL CALC-MCNC: 85 MG/DL
LYMPHOCYTES # BLD AUTO: 1.89 K/UL (ref 1–4.8)
LYMPHOCYTES NFR BLD: 24.7 % (ref 22–41)
MCH RBC QN AUTO: 27 PG (ref 27–33)
MCHC RBC AUTO-ENTMCNC: 32.8 G/DL (ref 32.2–35.5)
MCV RBC AUTO: 82.4 FL (ref 81.4–97.8)
MONOCYTES # BLD AUTO: 0.54 K/UL (ref 0–0.85)
MONOCYTES NFR BLD AUTO: 7.1 % (ref 0–13.4)
NEUTROPHILS # BLD AUTO: 5 K/UL (ref 1.82–7.42)
NEUTROPHILS NFR BLD: 65.3 % (ref 44–72)
NRBC # BLD AUTO: 0 K/UL
NRBC BLD-RTO: 0 /100 WBC (ref 0–0.2)
PLATELET # BLD AUTO: 323 K/UL (ref 164–446)
PMV BLD AUTO: 11.1 FL (ref 9–12.9)
POTASSIUM SERPL-SCNC: 4.6 MMOL/L (ref 3.6–5.5)
PROT SERPL-MCNC: 7 G/DL (ref 6–8.2)
RBC # BLD AUTO: 5.56 M/UL (ref 4.2–5.4)
SODIUM SERPL-SCNC: 139 MMOL/L (ref 135–145)
T3FREE SERPL-MCNC: 2.69 PG/ML (ref 2–4.4)
T3FREE SERPL-MCNC: 2.75 PG/ML (ref 2–4.4)
T4 FREE SERPL-MCNC: 1.54 NG/DL (ref 0.93–1.7)
TIBC SERPL-MCNC: 206 UG/DL (ref 250–450)
TRIGL SERPL-MCNC: 77 MG/DL (ref 0–149)
TSH SERPL-ACNC: 0.01 UIU/ML (ref 0.35–5.5)
TSH SERPL-ACNC: 0.01 UIU/ML (ref 0.35–5.5)
UIBC SERPL-MCNC: 128 UG/DL (ref 110–370)
WBC # BLD AUTO: 7.7 K/UL (ref 4.8–10.8)

## 2025-02-08 PROCEDURE — 36415 COLL VENOUS BLD VENIPUNCTURE: CPT

## 2025-02-08 PROCEDURE — 83540 ASSAY OF IRON: CPT

## 2025-02-08 PROCEDURE — 85025 COMPLETE CBC W/AUTO DIFF WBC: CPT

## 2025-02-08 PROCEDURE — 84481 FREE ASSAY (FT-3): CPT

## 2025-02-08 PROCEDURE — 84439 ASSAY OF FREE THYROXINE: CPT

## 2025-02-08 PROCEDURE — 84481 FREE ASSAY (FT-3): CPT | Mod: 91

## 2025-02-08 PROCEDURE — 82728 ASSAY OF FERRITIN: CPT

## 2025-02-08 PROCEDURE — 83036 HEMOGLOBIN GLYCOSYLATED A1C: CPT

## 2025-02-08 PROCEDURE — 83550 IRON BINDING TEST: CPT

## 2025-02-08 PROCEDURE — 80061 LIPID PANEL: CPT

## 2025-02-08 PROCEDURE — 84443 ASSAY THYROID STIM HORMONE: CPT | Mod: 91

## 2025-02-08 PROCEDURE — 80053 COMPREHEN METABOLIC PANEL: CPT

## 2025-02-08 PROCEDURE — 84443 ASSAY THYROID STIM HORMONE: CPT

## 2025-02-16 LAB — T4 FREE SERPL DIALY-MCNC: 2.6 NG/DL (ref 1.1–2.4)

## 2025-05-30 ENCOUNTER — OFFICE VISIT (OUTPATIENT)
Dept: URGENT CARE | Facility: PHYSICIAN GROUP | Age: 40
End: 2025-05-30
Payer: COMMERCIAL

## 2025-05-30 VITALS
SYSTOLIC BLOOD PRESSURE: 102 MMHG | DIASTOLIC BLOOD PRESSURE: 84 MMHG | HEART RATE: 85 BPM | TEMPERATURE: 98.6 F | OXYGEN SATURATION: 98 % | BODY MASS INDEX: 34.2 KG/M2 | WEIGHT: 187 LBS | RESPIRATION RATE: 16 BRPM

## 2025-05-30 DIAGNOSIS — J11.1 INFLUENZA-LIKE ILLNESS: ICD-10-CM

## 2025-05-30 DIAGNOSIS — J06.9 URI WITH COUGH AND CONGESTION: ICD-10-CM

## 2025-05-30 DIAGNOSIS — M79.10 MYALGIA: Primary | ICD-10-CM

## 2025-05-30 RX ORDER — ESCITALOPRAM OXALATE 10 MG/1
10 TABLET ORAL
COMMUNITY

## 2025-05-30 RX ORDER — LIOTHYRONINE SODIUM 5 UG/1
TABLET ORAL
COMMUNITY

## 2025-05-30 RX ORDER — TIRZEPATIDE 7.5 MG/.5ML
INJECTION, SOLUTION SUBCUTANEOUS
COMMUNITY

## 2025-05-30 RX ORDER — KETOROLAC TROMETHAMINE 15 MG/ML
15 INJECTION, SOLUTION INTRAMUSCULAR; INTRAVENOUS ONCE
Status: COMPLETED | OUTPATIENT
Start: 2025-05-30 | End: 2025-05-30

## 2025-05-30 RX ORDER — LEVONORGESTREL 52 MG/1
1 INTRAUTERINE DEVICE INTRAUTERINE ONCE
COMMUNITY

## 2025-05-30 RX ORDER — OMEPRAZOLE 20 MG/1
CAPSULE, DELAYED RELEASE ORAL
COMMUNITY

## 2025-05-30 RX ORDER — BUPROPION HYDROCHLORIDE 300 MG/1
300 TABLET ORAL EVERY MORNING
COMMUNITY

## 2025-05-30 RX ORDER — ERGOCALCIFEROL 1.25 MG/1
1 CAPSULE ORAL
COMMUNITY

## 2025-05-30 RX ORDER — NAPROXEN 500 MG/1
TABLET ORAL
COMMUNITY
Start: 2025-03-07 | End: 2025-05-30

## 2025-05-30 RX ORDER — LIOTHYRONINE SODIUM 25 UG/1
TABLET ORAL
COMMUNITY

## 2025-05-30 RX ORDER — TIRZEPATIDE 5 MG/.5ML
INJECTION, SOLUTION SUBCUTANEOUS
COMMUNITY

## 2025-05-30 RX ADMIN — KETOROLAC TROMETHAMINE 15 MG: 15 INJECTION, SOLUTION INTRAMUSCULAR; INTRAVENOUS at 12:10

## 2025-05-30 ASSESSMENT — FIBROSIS 4 INDEX: FIB4 SCORE: 0.64

## 2025-05-30 NOTE — PROGRESS NOTES
Urgent Care Visit Note  [facility]    05/30/25    Cookie Noriega     [address] [facility] [address]     PCP: Esha Real     Subjective:     Chief Complaint   Patient presents with    Shortness of Breath    Flu Like Symptoms     X2days        HPI:  Cookie Noriega is a 40 y.o. female who presents for cough, congestion, rhinorrhea, and diffuse myalgias.  This is the third day of symptoms.  She denies any obvious precipitating factors.  Felt myalgias in her chest initially but now feels some near her scapula on both sides.  Feels a lot of soreness in those areas that worsens with deep inspiration but denies shortness of breath otherwise.  Has not taken her temperature but no known fever or chills.    ROS:  ROS     CURRENT MEDICATIONS:  Encounter Medications with Dispense Information[1]    ALLERGIES:   Allergies[2]    PROBLEM LIST:    does not have any pertinent problems on file.    Allergies, Medications, & Tobacco/Substance Use were reconciled by the Medical Assistant and reviewed by myself.     Objective:     /84   Pulse 85   Temp 37 °C (98.6 °F) (Temporal)   Resp 16   Wt 84.8 kg (187 lb)   SpO2 98%   BMI 34.20 kg/m²     Physical Exam  HENT:      Head: Normocephalic and atraumatic.      Right Ear: External ear normal.      Left Ear: External ear normal.      Nose: Congestion and rhinorrhea present.      Mouth/Throat:      Mouth: Mucous membranes are moist.      Pharynx: No oropharyngeal exudate or posterior oropharyngeal erythema.   Eyes:      General:         Right eye: No discharge.         Left eye: No discharge.      Extraocular Movements: Extraocular movements intact.      Pupils: Pupils are equal, round, and reactive to light.   Cardiovascular:      Rate and Rhythm: Normal rate and regular rhythm.      Pulses: Normal pulses.   Pulmonary:      Effort: Pulmonary effort is normal. No respiratory distress.      Breath sounds: Normal breath sounds. No stridor. No  wheezing, rhonchi or rales.   Abdominal:      General: Abdomen is flat.   Musculoskeletal:         General: No tenderness.      Comments: No pain with movement in the upper back areas.  No significant tenderness to palpation.  No deformity.  No midline tenderness.  No step-offs.   Skin:     General: Skin is warm.      Capillary Refill: Capillary refill takes less than 2 seconds.   Neurological:      Mental Status: She is alert and oriented to person, place, and time.   Psychiatric:         Mood and Affect: Mood normal.         Behavior: Behavior normal.           Lab Results/POC Test Results         Assessment/Plan:     Patient's history and physical exam consistent with:    Assessment & Plan  Myalgia    Orders:    ketorolac (Toradol) 15 MG/ML injection 15 mg    Influenza-like illness    URI with cough and congestion      Very likely to be a viral URI/influenza-like illness with associated myalgias.  Patient is not short of breath, which was clarified today.  She does feel soreness in her back related to coughing that worsens with deep inspiration.  Symptoms are bilateral and she is PERC negative.  Entirely normal vital signs and entirely well-appearing.  No signs of DVT.  No current chest pain.    Educated on supportive care.  Given precautions regarding new or worsening symptoms.  Educated to follow-up with her primary care provider as needed.    Discussed differential diagnosis, management options, risks/benefits, and alternatives to planned treatment. Pt expressed understanding and the treatment plan was agreed upon. Questions were encouraged and answered. Pt encouraged to return to urgent care as needed if new or worsening symptoms or if there is no improvement in condition. Pt educated in red flags and indications to immediately call 911 or present to the Emergency Department. Advised the patient to follow-up with the primary care physician for recheck, reevaluation, and further management.    I personally  reviewed prior external notes and test results pertinent to today's visit. I have independently reviewed and interpreted all diagnostics ordered during this visit.    Please note that this dictation was created using voice recognition software. I have made a reasonable attempt to correct obvious errors, but I expect that there are errors of grammar and possibly content that I did not discover before finalizing the note.    This note was electronically signed by Cipriano Quinn MD               [1]   Current Outpatient Medications   Medication Sig Refill Last Dispense    buPROPion (WELLBUTRIN XL) 300 MG XL tablet Take 300 mg by mouth every morning. (Patient not taking: Reported on 5/30/2025)  Unknown (patient-reported)    ergocalciferol (DRISDOL) 04688 UNIT capsule 1 Capsule.  Unknown (patient-reported)    escitalopram (LEXAPRO) 10 MG Tab Take 10 mg by mouth every day. (Patient not taking: Reported on 5/30/2025)  Unknown (patient-reported)    FLUoxetine (PROZAC) 20 MG Cap Oral (Patient not taking: Reported on 5/30/2025)  Unknown (patient-reported)    levonorgestrel (MIRENA, 52 MG,) 20 MCG/DAY IUD 1 Each by Intrauterine route one time.  Unknown (patient-reported)    liothyronine (CYTOMEL) 25 MCG Tab take 1 tablet (25 mcg) by oral route once daily Oral 1 (Patient not taking: Reported on 5/30/2025)  Unknown (patient-reported)    liothyronine (CYTOMEL) 5 MCG Tab TAKE 1 TABLET BY MOUTH DAILY ON AN EMPTY STOMACH  Unknown (patient-reported)    naproxen (NAPROSYN) 500 MG Tab TAKE 1 TABLET BY MOUTH EVERY 12 HOURS WITH FOOD OR MILK AS NEEDED  Unknown (patient-reported)    omeprazole (PRILOSEC) 20 MG delayed-release capsule Oral (Patient not taking: Reported on 5/30/2025)  Unknown (patient-reported)    SYNTHROID 200 MCG Tab Take 200-300 mcg by mouth every morning on an empty stomach. 6 days 200 mcg  and 300 mcg on one day a week  Brand Name Only  Unknown (patient-reported)    ZEPBOUND 5 MG/0.5ML Solution Auto-injector ADMINISTER  5 MG UNDER THE SKIN WEEKLY (Patient not taking: Reported on 5/30/2025)  Unknown (patient-reported)    ZEPBOUND 7.5 MG/0.5ML Solution Auto-injector ADMINISTER 7.5 MG UNDER THE SKIN WEEKLY  Unknown (patient-reported)   [2]   Allergies  Allergen Reactions    Hydrocodone Unspecified     Gets super hyper    Sulfa Drugs      Other reaction(s): Unknown    Tramadol Itching     Other reaction(s): Unknown

## 2025-08-07 ENCOUNTER — HOSPITAL ENCOUNTER (OUTPATIENT)
Dept: LAB | Facility: MEDICAL CENTER | Age: 40
End: 2025-08-07
Attending: INTERNAL MEDICINE
Payer: COMMERCIAL

## 2025-08-07 LAB
DHEA-S SERPL-MCNC: 46.5 UG/DL (ref 60.9–337)
EST. AVERAGE GLUCOSE BLD GHB EST-MCNC: 91 MG/DL
ESTRADIOL SERPL-MCNC: 150 PG/ML
FSH SERPL-ACNC: 7.5 MIU/ML
HBA1C MFR BLD: 4.8 % (ref 4–5.6)
LH SERPL-ACNC: 6.6 IU/L
T3FREE SERPL-MCNC: 3.92 PG/ML (ref 2–4.4)
T4 FREE SERPL-MCNC: 2.38 NG/DL (ref 0.93–1.7)
TSH SERPL-ACNC: <0.005 UIU/ML (ref 0.38–5.33)
VIT B12 SERPL-MCNC: 1198 PG/ML (ref 211–911)

## 2025-08-07 PROCEDURE — 82627 DEHYDROEPIANDROSTERONE: CPT

## 2025-08-07 PROCEDURE — 84443 ASSAY THYROID STIM HORMONE: CPT

## 2025-08-07 PROCEDURE — 82626 DEHYDROEPIANDROSTERONE: CPT

## 2025-08-07 PROCEDURE — 83036 HEMOGLOBIN GLYCOSYLATED A1C: CPT

## 2025-08-07 PROCEDURE — 84270 ASSAY OF SEX HORMONE GLOBUL: CPT

## 2025-08-07 PROCEDURE — 84439 ASSAY OF FREE THYROXINE: CPT

## 2025-08-07 PROCEDURE — 84403 ASSAY OF TOTAL TESTOSTERONE: CPT

## 2025-08-07 PROCEDURE — 84402 ASSAY OF FREE TESTOSTERONE: CPT

## 2025-08-07 PROCEDURE — 82670 ASSAY OF TOTAL ESTRADIOL: CPT

## 2025-08-07 PROCEDURE — 82157 ASSAY OF ANDROSTENEDIONE: CPT

## 2025-08-07 PROCEDURE — 82607 VITAMIN B-12: CPT

## 2025-08-07 PROCEDURE — 36415 COLL VENOUS BLD VENIPUNCTURE: CPT

## 2025-08-07 PROCEDURE — 83001 ASSAY OF GONADOTROPIN (FSH): CPT

## 2025-08-07 PROCEDURE — 84481 FREE ASSAY (FT-3): CPT

## 2025-08-07 PROCEDURE — 83002 ASSAY OF GONADOTROPIN (LH): CPT

## 2025-08-14 LAB
ANDROST SERPL-MCNC: 0.41 NG/ML (ref 0.26–2.14)
DHEA SERPL-MCNC: 1.58 NG/ML (ref 1.33–7.78)
SHBG SERPL-SCNC: 110 NMOL/L (ref 25–122)
TESTOST FREE SERPL-MCNC: 1.2 PG/ML (ref 1.3–9.2)
TESTOST SERPL-MCNC: 17 NG/DL (ref 9–55)

## (undated) DEVICE — KIT ANESTHESIA W/CIRCUIT & 3/LT BAG W/FILTER (20EA/CA)

## (undated) DEVICE — HEAD HOLDER JUNIOR/ADULT

## (undated) DEVICE — GOWN WARMING STANDARD FLEX - (30/CA)

## (undated) DEVICE — MASK ANESTHESIA ADULT  - (100/CA)

## (undated) DEVICE — SLEEVE, VASO, THIGH, MED

## (undated) DEVICE — GOWN SURGICAL X-LARGE ULTRA - FILM-REINFORCED (20/CA)

## (undated) DEVICE — LACTATED RINGERS INJ 1000 ML - (14EA/CA 60CA/PF)

## (undated) DEVICE — TRAY SKIN SCRUB PVP WET (20EA/CA) PART #DYND70356 DISCONTINUED

## (undated) DEVICE — PROTECTOR ULNA NERVE - (36PR/CA)

## (undated) DEVICE — FIBRILLAR SURGICEL 4X4 - 10/CA

## (undated) DEVICE — TUBE CONNECTING SUCTION - CLEAR PLASTIC STERILE 72 IN (50EA/CA)

## (undated) DEVICE — SHEET THYROID - (10EA/CA)

## (undated) DEVICE — ELECTRODE 850 FOAM ADHESIVE - HYDROGEL RADIOTRNSPRNT (50/PK)

## (undated) DEVICE — GOWN SURGEONS X-LARGE - DISP. (30/CA)

## (undated) DEVICE — TUBE EMG NIM TRIVANTAGE 7MM (3EA/PK)

## (undated) DEVICE — WATER IRRIGATION STERILE 1000ML (12EA/CA)

## (undated) DEVICE — SUTURE 2-0 VICRYL PLUS CT-2 - 27 INCH (36/BX)

## (undated) DEVICE — SPONGE GAUZESTER 4 X 4 4PLY - (128PK/CA)

## (undated) DEVICE — SET LEADWIRE 5 LEAD BEDSIDE DISPOSABLE ECG (1SET OF 5/EA)

## (undated) DEVICE — ELECTRODE DUAL RETURN W/ CORD - (50/PK)

## (undated) DEVICE — SUTURE 3-0 VICRYL PLUS RB-1 - 8 X 18 INCH (12/BX)

## (undated) DEVICE — SUTURE 4-0 VICRYL PLUS FS-2 - 27 INCH (36/BX)

## (undated) DEVICE — SHEAR HS FOCUS 9CM CVD - (6/BX)

## (undated) DEVICE — LEAD SET 6 DISP. EKG NIHON KOHDEN

## (undated) DEVICE — CATHETER IV 20 GA X 1-1/4 ---SURG.& SDS ONLY--- (50EA/BX)

## (undated) DEVICE — TOWEL STOP TIMEOUT SAFETY FLAG (40EA/CA)

## (undated) DEVICE — GOWN SURGEONS LARGE - (32/CA)

## (undated) DEVICE — GLOVE COTTON STERILE (50/CA)

## (undated) DEVICE — PACK MINOR BASIN - (2EA/CA)

## (undated) DEVICE — NEPTUNE 4 PORT MANIFOLD - (20/PK)

## (undated) DEVICE — SUTURE GENERAL

## (undated) DEVICE — SODIUM CHL IRRIGATION 0.9% 1000ML (12EA/CA)

## (undated) DEVICE — DRAPE MAGNETIC (INSTRA-MAG) - (30/CA)

## (undated) DEVICE — TUBE EMG NIM TRIVANTAGE 6MM (3EA/PK)

## (undated) DEVICE — SUTURE 3-0 VICRYL PLUS SH - 27 INCH (36/BX)

## (undated) DEVICE — SENSOR SPO2 NEO LNCS ADHESIVE (20/BX) SEE USER NOTES

## (undated) DEVICE — PROBE PRASS STAND STIMULATING (5EA/PK)

## (undated) DEVICE — GLOVE BIOGEL SZ 7 SURGICAL PF LTX - (50PR/BX 4BX/CA)

## (undated) DEVICE — GLOVE BIOGEL SZ 7.5 SURGICAL PF LTX - (50PR/BX 4BX/CA)

## (undated) DEVICE — TRAY CATHETER FOLEY URINE METER W/STATLOCK 350ML (10EA/CA)

## (undated) DEVICE — TUBE EMG NIM TRIVANTAGE 6MM

## (undated) DEVICE — TRAY SRGPRP PVP IOD WT PRP - (20/CA)

## (undated) DEVICE — SUCTION INSTRUMENT YANKAUER BULBOUS TIP W/O VENT (50EA/CA)

## (undated) DEVICE — GLOVE SZ 7.5 BIOGEL PI MICRO - PF LF (50PR/BX)

## (undated) DEVICE — SUTURE  5-0 SILK CV22 5 X 18 - (24PK/CA)

## (undated) DEVICE — NEEDLE SHARP EZ CLEAN 2.0 CM (12EA/BX)

## (undated) DEVICE — DRESSING TRANSPARENT FILM TEGADERM 2.375 X 2.75"  (100EA/BX)"

## (undated) DEVICE — DRAPE VAGINAL BIB W/ POUCH (10EA/CA)

## (undated) DEVICE — GLOVE BIOGEL SZ 6.5 SURGICAL PF LTX (50PR/BX 4BX/CA)

## (undated) DEVICE — TUBING CLEARLINK DUO-VENT - C-FLO (48EA/CA)

## (undated) DEVICE — GLOVE BIOGEL PI INDICATOR SZ 8.0 SURGICAL PF LF -(50/BX 4BX/CA)

## (undated) DEVICE — TUBE EMG NIM TRIVANTAGE 7MM

## (undated) DEVICE — SUTURE 2-0 MONOCRYL SH&UR-6 27 - (12/BX)

## (undated) DEVICE — PAD SANITARY 11IN MAXI IND WRAPPED  (12EA/PK 24PK/CA)

## (undated) DEVICE — SET EXTENSION WITH 2 PORTS (48EA/CA) ***PART #2C8610 IS A SUBSTITUTE*****

## (undated) DEVICE — CANISTER SUCTION 3000ML MECHANICAL FILTER AUTO SHUTOFF MEDI-VAC NONSTERILE LF DISP  (40EA/CA)

## (undated) DEVICE — CANISTER SUCTION RIGID RED 1500CC (40EA/CA)

## (undated) DEVICE — GLOVE BIOGEL PI INDICATOR SZ 7.5 SURGICAL PF LF -(50/BX 4BX/CA)

## (undated) DEVICE — KIT  I.V. START (100EA/CA)

## (undated) DEVICE — STOCKINGTHIGH HIGH LARGE LONG - (6PR/BX)

## (undated) DEVICE — SPONGE PEANUT - (5/PK 50PK/CA)

## (undated) DEVICE — PACK MAJOR BASIN - (2EA/CA)

## (undated) DEVICE — DRAPE STRLE REG TOWEL 18X24 - (10/BX 4BX/CA)"

## (undated) DEVICE — SUTURE 3-0 VICRYL PLUS SH - 8X 18 INCH (12/BX)